# Patient Record
Sex: MALE | Race: BLACK OR AFRICAN AMERICAN | NOT HISPANIC OR LATINO | Employment: OTHER | URBAN - METROPOLITAN AREA
[De-identification: names, ages, dates, MRNs, and addresses within clinical notes are randomized per-mention and may not be internally consistent; named-entity substitution may affect disease eponyms.]

---

## 2023-04-07 DIAGNOSIS — I10 ESSENTIAL (PRIMARY) HYPERTENSION: ICD-10-CM

## 2023-04-07 DIAGNOSIS — E11.65 TYPE 2 DIABETES MELLITUS WITH HYPERGLYCEMIA (MULTI): ICD-10-CM

## 2023-04-07 RX ORDER — LOSARTAN POTASSIUM 100 MG/1
TABLET ORAL
Qty: 90 TABLET | Refills: 0 | Status: SHIPPED | OUTPATIENT
Start: 2023-04-07 | End: 2023-07-19 | Stop reason: SDUPTHER

## 2023-04-07 RX ORDER — METFORMIN HYDROCHLORIDE 500 MG/1
TABLET, EXTENDED RELEASE ORAL
Qty: 180 TABLET | Refills: 0 | Status: SHIPPED | OUTPATIENT
Start: 2023-04-07 | End: 2023-07-19 | Stop reason: SDUPTHER

## 2023-04-18 PROBLEM — F31.9 BIPOLAR AFFECTIVE DISORDER (MULTI): Status: ACTIVE | Noted: 2023-04-18

## 2023-04-18 PROBLEM — Z98.890 H/O COLONOSCOPY: Status: RESOLVED | Noted: 2023-04-18 | Resolved: 2023-04-18

## 2023-04-18 PROBLEM — I10 HYPERTENSION: Status: ACTIVE | Noted: 2023-04-18

## 2023-04-18 PROBLEM — K63.5 COLON POLYP: Status: RESOLVED | Noted: 2023-04-18 | Resolved: 2023-04-18

## 2023-04-18 PROBLEM — E11.9 DIABETES MELLITUS TYPE 2 WITHOUT RETINOPATHY (MULTI): Status: ACTIVE | Noted: 2023-04-18

## 2023-04-18 PROBLEM — F32.A DEPRESSION: Status: RESOLVED | Noted: 2023-04-18 | Resolved: 2023-04-18

## 2023-04-18 PROBLEM — K92.2 LOWER GI BLEED: Status: RESOLVED | Noted: 2023-04-18 | Resolved: 2023-04-18

## 2023-04-18 PROBLEM — H52.13 MYOPIA OF BOTH EYES WITH ASTIGMATISM: Status: RESOLVED | Noted: 2023-04-18 | Resolved: 2023-04-18

## 2023-04-18 PROBLEM — K31.89 EROSIVE GASTROPATHY: Status: RESOLVED | Noted: 2023-04-18 | Resolved: 2023-04-18

## 2023-04-18 PROBLEM — F20.9 SCHIZOPHRENIA (MULTI): Status: ACTIVE | Noted: 2023-04-18

## 2023-04-18 PROBLEM — H52.203 MYOPIA OF BOTH EYES WITH ASTIGMATISM: Status: RESOLVED | Noted: 2023-04-18 | Resolved: 2023-04-18

## 2023-04-18 PROBLEM — E78.5 HYPERLIPIDEMIA: Status: ACTIVE | Noted: 2023-04-18

## 2023-04-18 PROBLEM — Z98.890 HISTORY OF ESOPHAGOGASTRODUODENOSCOPY (EGD): Status: RESOLVED | Noted: 2023-04-18 | Resolved: 2023-04-18

## 2023-06-01 ENCOUNTER — OFFICE VISIT (OUTPATIENT)
Dept: PRIMARY CARE | Facility: CLINIC | Age: 48
End: 2023-06-01
Payer: COMMERCIAL

## 2023-06-01 VITALS
SYSTOLIC BLOOD PRESSURE: 119 MMHG | HEART RATE: 79 BPM | TEMPERATURE: 97.8 F | HEIGHT: 70 IN | BODY MASS INDEX: 40.46 KG/M2 | DIASTOLIC BLOOD PRESSURE: 72 MMHG | WEIGHT: 282.6 LBS

## 2023-06-01 DIAGNOSIS — E11.9 DIABETES MELLITUS TYPE 2 WITHOUT RETINOPATHY (MULTI): Primary | ICD-10-CM

## 2023-06-01 DIAGNOSIS — E78.2 MIXED HYPERLIPIDEMIA: ICD-10-CM

## 2023-06-01 DIAGNOSIS — K31.89 EROSIVE GASTROPATHY: ICD-10-CM

## 2023-06-01 LAB — POC HEMOGLOBIN A1C: 6.3 % (ref 4.2–6.5)

## 2023-06-01 PROCEDURE — 3074F SYST BP LT 130 MM HG: CPT | Performed by: INTERNAL MEDICINE

## 2023-06-01 PROCEDURE — 1036F TOBACCO NON-USER: CPT | Performed by: INTERNAL MEDICINE

## 2023-06-01 PROCEDURE — 3078F DIAST BP <80 MM HG: CPT | Performed by: INTERNAL MEDICINE

## 2023-06-01 PROCEDURE — 4010F ACE/ARB THERAPY RXD/TAKEN: CPT | Performed by: INTERNAL MEDICINE

## 2023-06-01 PROCEDURE — 99213 OFFICE O/P EST LOW 20 MIN: CPT | Performed by: INTERNAL MEDICINE

## 2023-06-01 PROCEDURE — 83036 HEMOGLOBIN GLYCOSYLATED A1C: CPT | Performed by: INTERNAL MEDICINE

## 2023-06-01 RX ORDER — FENOFIBRATE 160 MG/1
160 TABLET ORAL DAILY
COMMUNITY
End: 2023-06-01 | Stop reason: SDUPTHER

## 2023-06-01 RX ORDER — FLUOXETINE HYDROCHLORIDE 40 MG/1
CAPSULE ORAL
COMMUNITY
Start: 2020-12-13

## 2023-06-01 RX ORDER — FENOFIBRATE 160 MG/1
160 TABLET ORAL DAILY
Qty: 90 TABLET | Refills: 3 | Status: SHIPPED | OUTPATIENT
Start: 2023-06-01 | End: 2023-10-04 | Stop reason: SDUPTHER

## 2023-06-01 RX ORDER — PANTOPRAZOLE SODIUM 40 MG/1
40 TABLET, DELAYED RELEASE ORAL DAILY
Qty: 90 TABLET | Refills: 0 | Status: SHIPPED | OUTPATIENT
Start: 2023-06-01 | End: 2023-10-04 | Stop reason: SDUPTHER

## 2023-06-01 RX ORDER — BREXPIPRAZOLE 1 MG/1
4 TABLET ORAL EVERY EVENING
COMMUNITY
Start: 2023-05-22

## 2023-06-01 RX ORDER — AMLODIPINE BESYLATE 2.5 MG/1
2.5 TABLET ORAL DAILY
COMMUNITY
End: 2023-10-04 | Stop reason: SDUPTHER

## 2023-06-01 RX ORDER — QUINIDINE GLUCONATE 324 MG
TABLET, EXTENDED RELEASE ORAL
COMMUNITY

## 2023-06-01 RX ORDER — ATORVASTATIN CALCIUM 20 MG/1
20 TABLET, FILM COATED ORAL NIGHTLY
COMMUNITY
End: 2023-10-04 | Stop reason: SDUPTHER

## 2023-06-01 RX ORDER — ILOPERIDONE 12 MG/1
TABLET ORAL
COMMUNITY
Start: 2021-01-19

## 2023-06-01 RX ORDER — GLIMEPIRIDE 4 MG/1
8 TABLET ORAL DAILY
COMMUNITY
End: 2023-10-04 | Stop reason: SDUPTHER

## 2023-06-01 RX ORDER — TRIAMTERENE/HYDROCHLOROTHIAZID 37.5-25 MG
1 TABLET ORAL DAILY
COMMUNITY
End: 2023-10-04 | Stop reason: SDUPTHER

## 2023-06-01 ASSESSMENT — ENCOUNTER SYMPTOMS
PALPITATIONS: 0
CHILLS: 0
COUGH: 0
POLYDIPSIA: 0
FEVER: 0
SHORTNESS OF BREATH: 0

## 2023-06-01 NOTE — PATIENT INSTRUCTIONS
Continue your current medications.  I have sent in a medication for you to use to help the stomach we will take this once a day for the next 3 months and then be able to stop it.  This should allow the stomach to calm down and heal very well.

## 2023-06-01 NOTE — PROGRESS NOTES
"Subjective   Patient ID: James Esqueda is a 48 y.o. male who presents for Follow-up.    48-year-old male presents today in routine follow-up with a history of diabetes.  He is got excellent control with an A1c today of 6.3.  He denies symptoms of polyuria polydipsia blurry vision.  He needs updated diabetic shoes the ones he currently have hard to carrying and replacements are needed.    One of his concerns today is a diagnosis of erosive gastritis with symptoms a couple times a month with some's upper gastric stomach discomfort.  Medication discussed and sent to pharmacy on patient behalf.    Reviewed patient prior blood work discussed refills of his fenofibrate we will continue it for the time being patient never has side effects including myalgias.  Cholesterol without it had triglycerides 250-300.  Outside of new side effects or concerns I would continue the medication plan as it is patient counseled and advised of this.         Review of Systems   Constitutional:  Negative for chills and fever.   Respiratory:  Negative for cough and shortness of breath.    Cardiovascular:  Negative for chest pain and palpitations.   Endocrine: Negative for polydipsia and polyuria.       Objective   /72   Pulse 79   Temp 36.6 °C (97.8 °F) (Temporal)   Ht 1.778 m (5' 10\")   Wt 128 kg (282 lb 9.6 oz)   BMI 40.55 kg/m²     Physical Exam  Constitutional:       Appearance: Normal appearance.   HENT:      Head: Normocephalic and atraumatic.   Eyes:      Extraocular Movements: Extraocular movements intact.      Pupils: Pupils are equal, round, and reactive to light.   Neck:      Thyroid: No thyroid mass or thyromegaly.      Vascular: No carotid bruit.   Cardiovascular:      Rate and Rhythm: Normal rate and regular rhythm.      Heart sounds: No murmur heard.     No friction rub. No gallop (s).   Pulmonary:      Effort: No respiratory distress.      Breath sounds: No wheezing, rhonchi or rales.   Musculoskeletal:      Cervical " back: Neck supple.      Right lower leg: No edema.      Left lower leg: No edema.   Lymphadenopathy:      Cervical: No cervical adenopathy.   Neurological:      Mental Status: He is alert.         Assessment/Plan   Problem List Items Addressed This Visit          Endocrine/Metabolic    Diabetes mellitus type 2 without retinopathy (CMS/HCC) - Primary    Relevant Orders    POCT glycosylated hemoglobin (Hb A1C) manually resulted (Completed)    Referral to Podiatry       Other    Hyperlipidemia    Relevant Medications    fenofibrate (Triglide) 160 mg tablet     Other Visit Diagnoses       Erosive gastropathy        Relevant Medications    pantoprazole (ProtoNix) 40 mg EC tablet

## 2023-06-21 LAB
BASOPHILS (10*3/UL) IN BLOOD BY AUTOMATED COUNT: 0.04 X10E9/L (ref 0–0.1)
BASOPHILS/100 LEUKOCYTES IN BLOOD BY AUTOMATED COUNT: 0.6 % (ref 0–2)
EOSINOPHILS (10*3/UL) IN BLOOD BY AUTOMATED COUNT: 0.1 X10E9/L (ref 0–0.7)
EOSINOPHILS/100 LEUKOCYTES IN BLOOD BY AUTOMATED COUNT: 1.5 % (ref 0–6)
ERYTHROCYTE DISTRIBUTION WIDTH (RATIO) BY AUTOMATED COUNT: 13.8 % (ref 11.5–14.5)
ERYTHROCYTE MEAN CORPUSCULAR HEMOGLOBIN CONCENTRATION (G/DL) BY AUTOMATED: 33.5 G/DL (ref 32–36)
ERYTHROCYTE MEAN CORPUSCULAR VOLUME (FL) BY AUTOMATED COUNT: 86 FL (ref 80–100)
ERYTHROCYTES (10*6/UL) IN BLOOD BY AUTOMATED COUNT: 4.04 X10E12/L (ref 4.5–5.9)
FERRITIN (UG/LL) IN SER/PLAS: 387 UG/L (ref 20–300)
HEMATOCRIT (%) IN BLOOD BY AUTOMATED COUNT: 34.6 % (ref 41–52)
HEMOGLOBIN (G/DL) IN BLOOD: 11.6 G/DL (ref 13.5–17.5)
IMMATURE GRANULOCYTES/100 LEUKOCYTES IN BLOOD BY AUTOMATED COUNT: 0.9 % (ref 0–0.9)
IRON (UG/DL) IN SER/PLAS: 66 UG/DL (ref 35–150)
IRON BINDING CAPACITY (UG/DL) IN SER/PLAS: 394 UG/DL (ref 240–445)
IRON SATURATION (%) IN SER/PLAS: 17 % (ref 25–45)
LEUKOCYTES (10*3/UL) IN BLOOD BY AUTOMATED COUNT: 6.7 X10E9/L (ref 4.4–11.3)
LYMPHOCYTES (10*3/UL) IN BLOOD BY AUTOMATED COUNT: 2.29 X10E9/L (ref 1.2–4.8)
LYMPHOCYTES/100 LEUKOCYTES IN BLOOD BY AUTOMATED COUNT: 34 % (ref 13–44)
MONOCYTES (10*3/UL) IN BLOOD BY AUTOMATED COUNT: 0.56 X10E9/L (ref 0.1–1)
MONOCYTES/100 LEUKOCYTES IN BLOOD BY AUTOMATED COUNT: 8.3 % (ref 2–10)
NEUTROPHILS (10*3/UL) IN BLOOD BY AUTOMATED COUNT: 3.69 X10E9/L (ref 1.2–7.7)
NEUTROPHILS/100 LEUKOCYTES IN BLOOD BY AUTOMATED COUNT: 54.7 % (ref 40–80)
PLATELETS (10*3/UL) IN BLOOD AUTOMATED COUNT: 375 X10E9/L (ref 150–450)

## 2023-06-26 LAB — SOLUBLE TRANSFERRIN RECEPTOR: 2.3 MG/L (ref 2.2–5)

## 2023-07-19 DIAGNOSIS — I10 ESSENTIAL (PRIMARY) HYPERTENSION: ICD-10-CM

## 2023-07-19 DIAGNOSIS — E11.65 TYPE 2 DIABETES MELLITUS WITH HYPERGLYCEMIA (MULTI): ICD-10-CM

## 2023-07-19 RX ORDER — LOSARTAN POTASSIUM 100 MG/1
100 TABLET ORAL DAILY
Qty: 90 TABLET | Refills: 1 | Status: SHIPPED | OUTPATIENT
Start: 2023-07-19 | End: 2024-01-10

## 2023-07-19 RX ORDER — METFORMIN HYDROCHLORIDE 500 MG/1
1000 TABLET, EXTENDED RELEASE ORAL DAILY
Qty: 180 TABLET | Refills: 0 | Status: SHIPPED | OUTPATIENT
Start: 2023-07-19 | End: 2023-10-04 | Stop reason: SDUPTHER

## 2023-09-15 DIAGNOSIS — I10 ESSENTIAL (PRIMARY) HYPERTENSION: ICD-10-CM

## 2023-09-15 RX ORDER — SITAGLIPTIN 100 MG/1
100 TABLET, FILM COATED ORAL DAILY
Qty: 90 TABLET | Refills: 2 | Status: SHIPPED | OUTPATIENT
Start: 2023-09-15 | End: 2023-10-04 | Stop reason: SDUPTHER

## 2023-10-04 ENCOUNTER — OFFICE VISIT (OUTPATIENT)
Dept: PRIMARY CARE | Facility: CLINIC | Age: 48
End: 2023-10-04
Payer: COMMERCIAL

## 2023-10-04 ENCOUNTER — LAB (OUTPATIENT)
Dept: LAB | Facility: LAB | Age: 48
End: 2023-10-04
Payer: COMMERCIAL

## 2023-10-04 VITALS
WEIGHT: 293.2 LBS | BODY MASS INDEX: 42.07 KG/M2 | HEART RATE: 80 BPM | DIASTOLIC BLOOD PRESSURE: 74 MMHG | TEMPERATURE: 98.6 F | SYSTOLIC BLOOD PRESSURE: 118 MMHG

## 2023-10-04 DIAGNOSIS — E11.9 DIABETES MELLITUS TYPE 2 WITHOUT RETINOPATHY (MULTI): ICD-10-CM

## 2023-10-04 DIAGNOSIS — K62.5 BRIGHT RED RECTAL BLEEDING: ICD-10-CM

## 2023-10-04 DIAGNOSIS — E11.65 TYPE 2 DIABETES MELLITUS WITH HYPERGLYCEMIA (MULTI): ICD-10-CM

## 2023-10-04 DIAGNOSIS — I10 ESSENTIAL (PRIMARY) HYPERTENSION: ICD-10-CM

## 2023-10-04 DIAGNOSIS — R52 PAIN AGGRAVATED BY SITTING: ICD-10-CM

## 2023-10-04 DIAGNOSIS — I10 PRIMARY HYPERTENSION: ICD-10-CM

## 2023-10-04 DIAGNOSIS — E78.2 MIXED HYPERLIPIDEMIA: ICD-10-CM

## 2023-10-04 DIAGNOSIS — K31.89 EROSIVE GASTROPATHY: ICD-10-CM

## 2023-10-04 DIAGNOSIS — R22.2 MASS OF BUTTOCK: ICD-10-CM

## 2023-10-04 DIAGNOSIS — K62.5 BRIGHT RED RECTAL BLEEDING: Primary | ICD-10-CM

## 2023-10-04 PROCEDURE — 83540 ASSAY OF IRON: CPT

## 2023-10-04 PROCEDURE — 83550 IRON BINDING TEST: CPT

## 2023-10-04 PROCEDURE — 1036F TOBACCO NON-USER: CPT | Performed by: INTERNAL MEDICINE

## 2023-10-04 PROCEDURE — 36415 COLL VENOUS BLD VENIPUNCTURE: CPT

## 2023-10-04 PROCEDURE — 3074F SYST BP LT 130 MM HG: CPT | Performed by: INTERNAL MEDICINE

## 2023-10-04 PROCEDURE — 3078F DIAST BP <80 MM HG: CPT | Performed by: INTERNAL MEDICINE

## 2023-10-04 PROCEDURE — 80053 COMPREHEN METABOLIC PANEL: CPT

## 2023-10-04 PROCEDURE — 85025 COMPLETE CBC W/AUTO DIFF WBC: CPT

## 2023-10-04 PROCEDURE — 83036 HEMOGLOBIN GLYCOSYLATED A1C: CPT

## 2023-10-04 PROCEDURE — 99214 OFFICE O/P EST MOD 30 MIN: CPT | Performed by: INTERNAL MEDICINE

## 2023-10-04 PROCEDURE — 4010F ACE/ARB THERAPY RXD/TAKEN: CPT | Performed by: INTERNAL MEDICINE

## 2023-10-04 RX ORDER — AMLODIPINE BESYLATE 2.5 MG/1
2.5 TABLET ORAL DAILY
Qty: 90 TABLET | Refills: 3 | Status: SHIPPED | OUTPATIENT
Start: 2023-10-04 | End: 2024-02-06 | Stop reason: SDUPTHER

## 2023-10-04 RX ORDER — METFORMIN HYDROCHLORIDE 500 MG/1
1000 TABLET, EXTENDED RELEASE ORAL DAILY
Qty: 180 TABLET | Refills: 3 | Status: SHIPPED | OUTPATIENT
Start: 2023-10-04 | End: 2024-02-06 | Stop reason: SDUPTHER

## 2023-10-04 RX ORDER — ATORVASTATIN CALCIUM 20 MG/1
20 TABLET, FILM COATED ORAL NIGHTLY
Qty: 90 TABLET | Refills: 3 | Status: SHIPPED | OUTPATIENT
Start: 2023-10-04 | End: 2024-02-06 | Stop reason: SDUPTHER

## 2023-10-04 RX ORDER — TRIAMTERENE/HYDROCHLOROTHIAZID 37.5-25 MG
1 TABLET ORAL DAILY
Qty: 90 TABLET | Refills: 3 | Status: SHIPPED | OUTPATIENT
Start: 2023-10-04 | End: 2024-02-06 | Stop reason: SDUPTHER

## 2023-10-04 RX ORDER — FENOFIBRATE 160 MG/1
160 TABLET ORAL DAILY
Qty: 90 TABLET | Refills: 3 | Status: SHIPPED | OUTPATIENT
Start: 2023-10-04 | End: 2024-02-06 | Stop reason: SDUPTHER

## 2023-10-04 RX ORDER — BREXPIPRAZOLE 4 MG/1
4 TABLET ORAL DAILY
COMMUNITY

## 2023-10-04 RX ORDER — GLIMEPIRIDE 4 MG/1
8 TABLET ORAL DAILY
Qty: 180 TABLET | Refills: 3 | Status: SHIPPED | OUTPATIENT
Start: 2023-10-04 | End: 2024-02-06 | Stop reason: SDUPTHER

## 2023-10-04 RX ORDER — PANTOPRAZOLE SODIUM 40 MG/1
40 TABLET, DELAYED RELEASE ORAL DAILY
Qty: 90 TABLET | Refills: 3 | Status: SHIPPED | OUTPATIENT
Start: 2023-10-04 | End: 2024-02-06 | Stop reason: SDUPTHER

## 2023-10-04 ASSESSMENT — ENCOUNTER SYMPTOMS
FEVER: 0
SHORTNESS OF BREATH: 0
COUGH: 0
POLYDIPSIA: 0
PALPITATIONS: 0
CHILLS: 0

## 2023-10-04 NOTE — PROGRESS NOTES
Subjective   Patient ID: James Esqueda is a 48 y.o. male who presents for Follow-up.    48-year-old male presents for routine diabetic follow-up.  He is no concerns about his current diabetic management medications hypoglycemia polyuria polydipsia or vision changes at this time.  All medications are taking with good compliance and no concerns.  He has no specific diabetic questions at this time.  He does report one acute concern in recent weeks.  He says that there has been recurring bouts of pain with sitting and recurring bright red blood in his underwear and by rectum he believes.  Been no significant blood in the toilet bowl on a bowel movement but when he sees blood it is bright red and most commonly in his underwear.  He says this has been happening intermittently for the last 3 to 4 weeks.  He has a history of prior colonoscopy x2 with noted diverticulosis nonbleeding and a prior nonbleeding external hemorrhoid noted.  His physical exam however shows a polypoid mass of the superior aspect of the right buttock with ulceration and nonactive bleeding at this time.  This would be consistent with the patient's history and there is evidence of previous bleeding noted.  General surgery evaluation ordered for removal and definitive care.  No evidence for rectal bleeding no visualized polyp hemorrhoid or anal fissure.    Blood work pending, patient will be notified upon completion.    Patient directed next-door to general surgeon's office for scheduling prior to departure from [facility] and to follow-up with me in 4 months for routine diabetic care         Review of Systems   Constitutional:  Negative for chills and fever.   Respiratory:  Negative for cough and shortness of breath.    Cardiovascular:  Negative for chest pain and palpitations.   Endocrine: Negative for polydipsia and polyuria.       Objective   /74   Pulse 80   Temp 37 °C (98.6 °F) (Temporal)   Wt 133 kg (293 lb 3.2 oz)   BMI 42.07 kg/m²      Physical Exam  Constitutional:       Appearance: Normal appearance.   HENT:      Head: Normocephalic and atraumatic.   Eyes:      Extraocular Movements: Extraocular movements intact.      Pupils: Pupils are equal, round, and reactive to light.   Neck:      Thyroid: No thyroid mass or thyromegaly.      Vascular: No carotid bruit.   Cardiovascular:      Rate and Rhythm: Normal rate and regular rhythm.      Heart sounds: No murmur heard.     No friction rub. No gallop.   Pulmonary:      Effort: No respiratory distress.      Breath sounds: No wheezing, rhonchi or rales.   Musculoskeletal:      Cervical back: Neck supple.      Right lower leg: No edema.      Left lower leg: No edema.   Lymphadenopathy:      Cervical: No cervical adenopathy.   Neurological:      Mental Status: He is alert.         Assessment/Plan   Problem List Items Addressed This Visit             ICD-10-CM    Diabetes mellitus type 2 without retinopathy (CMS/HCC) E11.9    Relevant Medications    atorvastatin (Lipitor) 20 mg tablet    Other Relevant Orders    Iron and TIBC    CBC and Auto Differential    Hemoglobin A1C    Comprehensive Metabolic Panel    Hyperlipidemia E78.5    Relevant Medications    fenofibrate (Triglide) 160 mg tablet    Hypertension I10    Relevant Medications    amLODIPine (Norvasc) 2.5 mg tablet    triamterene-hydrochlorothiazid (Maxzide-25) 37.5-25 mg tablet    Other Relevant Orders    Iron and TIBC    CBC and Auto Differential    Hemoglobin A1C    Comprehensive Metabolic Panel     Other Visit Diagnoses         Codes    Bright red rectal bleeding    -  Primary K62.5    Relevant Orders    Iron and TIBC    CBC and Auto Differential    Hemoglobin A1C    Comprehensive Metabolic Panel    Pain aggravated by sitting     R52    Relevant Orders    Iron and TIBC    CBC and Auto Differential    Hemoglobin A1C    Comprehensive Metabolic Panel    Mass of buttock     R22.2    Relevant Orders    Referral to General Surgery    Erosive  gastropathy     K31.89    Relevant Medications    pantoprazole (ProtoNix) 40 mg EC tablet

## 2023-10-05 LAB
ALBUMIN SERPL BCP-MCNC: 4.6 G/DL (ref 3.4–5)
ALP SERPL-CCNC: 43 U/L (ref 33–120)
ALT SERPL W P-5'-P-CCNC: 17 U/L (ref 10–52)
ANION GAP SERPL CALC-SCNC: 14 MMOL/L (ref 10–20)
AST SERPL W P-5'-P-CCNC: 14 U/L (ref 9–39)
BASOPHILS # BLD AUTO: 0.03 X10*3/UL (ref 0–0.1)
BASOPHILS NFR BLD AUTO: 0.4 %
BILIRUB SERPL-MCNC: 0.4 MG/DL (ref 0–1.2)
BUN SERPL-MCNC: 20 MG/DL (ref 6–23)
CALCIUM SERPL-MCNC: 10.2 MG/DL (ref 8.6–10.6)
CHLORIDE SERPL-SCNC: 104 MMOL/L (ref 98–107)
CO2 SERPL-SCNC: 28 MMOL/L (ref 21–32)
CREAT SERPL-MCNC: 1.29 MG/DL (ref 0.5–1.3)
EOSINOPHIL # BLD AUTO: 0.1 X10*3/UL (ref 0–0.7)
EOSINOPHIL NFR BLD AUTO: 1.3 %
ERYTHROCYTE [DISTWIDTH] IN BLOOD BY AUTOMATED COUNT: 13.8 % (ref 11.5–14.5)
EST. AVERAGE GLUCOSE BLD GHB EST-MCNC: 143 MG/DL
GFR SERPL CREATININE-BSD FRML MDRD: 69 ML/MIN/1.73M*2
GLUCOSE SERPL-MCNC: 82 MG/DL (ref 74–99)
HBA1C MFR BLD: 6.6 %
HCT VFR BLD AUTO: 37.7 % (ref 41–52)
HGB BLD-MCNC: 11.8 G/DL (ref 13.5–17.5)
IMM GRANULOCYTES # BLD AUTO: 0.05 X10*3/UL (ref 0–0.7)
IMM GRANULOCYTES NFR BLD AUTO: 0.7 % (ref 0–0.9)
IRON SATN MFR SERPL: 13 % (ref 25–45)
IRON SERPL-MCNC: 56 UG/DL (ref 35–150)
LYMPHOCYTES # BLD AUTO: 2.56 X10*3/UL (ref 1.2–4.8)
LYMPHOCYTES NFR BLD AUTO: 34.3 %
MCH RBC QN AUTO: 27.3 PG (ref 26–34)
MCHC RBC AUTO-ENTMCNC: 31.3 G/DL (ref 32–36)
MCV RBC AUTO: 87 FL (ref 80–100)
MONOCYTES # BLD AUTO: 0.58 X10*3/UL (ref 0.1–1)
MONOCYTES NFR BLD AUTO: 7.8 %
NEUTROPHILS # BLD AUTO: 4.14 X10*3/UL (ref 1.2–7.7)
NEUTROPHILS NFR BLD AUTO: 55.5 %
NRBC BLD-RTO: 0 /100 WBCS (ref 0–0)
PLATELET # BLD AUTO: 441 X10*3/UL (ref 150–450)
PMV BLD AUTO: 9.3 FL (ref 7.5–11.5)
POTASSIUM SERPL-SCNC: 4.6 MMOL/L (ref 3.5–5.3)
PROT SERPL-MCNC: 7.3 G/DL (ref 6.4–8.2)
RBC # BLD AUTO: 4.32 X10*6/UL (ref 4.5–5.9)
SODIUM SERPL-SCNC: 141 MMOL/L (ref 136–145)
TIBC SERPL-MCNC: 423 UG/DL (ref 240–445)
UIBC SERPL-MCNC: 367 UG/DL (ref 110–370)
WBC # BLD AUTO: 7.5 X10*3/UL (ref 4.4–11.3)

## 2023-10-05 NOTE — RESULT ENCOUNTER NOTE
Pt labs look great. Sugar continues to be excellent in control. Blood counts and iron levels normal. No changes to plan, see surgery for appt and should be fine

## 2023-10-18 PROBLEM — R10.9 ABDOMINAL CRAMPING: Status: ACTIVE | Noted: 2023-10-18

## 2023-10-18 PROBLEM — M77.8 FINGER TENDINITIS: Status: ACTIVE | Noted: 2023-10-18

## 2023-10-18 PROBLEM — M54.50 CHRONIC LOW BACK PAIN: Status: ACTIVE | Noted: 2023-10-18

## 2023-10-18 PROBLEM — D64.9 ANEMIA: Status: ACTIVE | Noted: 2023-10-18

## 2023-10-18 PROBLEM — G89.29 CHRONIC LOW BACK PAIN: Status: ACTIVE | Noted: 2023-10-18

## 2023-10-18 PROBLEM — R05.3 CHRONIC COUGH: Status: ACTIVE | Noted: 2023-10-18

## 2023-10-19 ENCOUNTER — OFFICE VISIT (OUTPATIENT)
Dept: SURGERY | Facility: CLINIC | Age: 48
End: 2023-10-19
Payer: COMMERCIAL

## 2023-10-19 ENCOUNTER — DOCUMENTATION (OUTPATIENT)
Dept: SURGERY | Facility: CLINIC | Age: 48
End: 2023-10-19

## 2023-10-19 VITALS — BODY MASS INDEX: 41.37 KG/M2 | HEIGHT: 70 IN | WEIGHT: 289 LBS

## 2023-10-19 DIAGNOSIS — K64.4 SKIN TAG OF PERIANAL REGION: Primary | ICD-10-CM

## 2023-10-19 DIAGNOSIS — R22.2 MASS OF BUTTOCK: ICD-10-CM

## 2023-10-19 PROCEDURE — 1036F TOBACCO NON-USER: CPT | Performed by: SURGERY

## 2023-10-19 PROCEDURE — 3044F HG A1C LEVEL LT 7.0%: CPT | Performed by: SURGERY

## 2023-10-19 PROCEDURE — 99203 OFFICE O/P NEW LOW 30 MIN: CPT | Performed by: SURGERY

## 2023-10-19 PROCEDURE — 4010F ACE/ARB THERAPY RXD/TAKEN: CPT | Performed by: SURGERY

## 2023-10-19 ASSESSMENT — PAIN SCALES - GENERAL: PAINLEVEL: 0-NO PAIN

## 2023-10-19 NOTE — PROGRESS NOTES
Subjective   Patient ID: James Esqueda is a 48 y.o. male who presents for evaluation of a mass involving his left buttock.       HPI  Pt is referred by Dr. Polanco for evaluation of a mass that is polypoid in the superior aspect of the right buttock with ulceration.  Patient states he has been symptomatic for 3 to 4 weeks.  Initially he says he had blood every day that he was able to note and it was associated with pain.  For the last week he does not note any blood and/or any pain.       Colonoscopy 2/2019 - polyps, nonbleeding hem  EGD 6/2016 - nonbleeding erosive gastropathy, hiatal hernia    Past Medical History:   Diagnosis Date    Bitten or stung by nonvenomous insect and other nonvenomous arthropods, initial encounter 10/08/2015    Insect bite, multiple    Depression 04/18/2023    Erosive gastropathy 04/18/2023    H/O colonoscopy 04/18/2023    History of esophagogastroduodenoscopy (EGD) 04/18/2023    Personal history of other infectious and parasitic diseases     History of scarlet fever    Personal history of other infectious and parasitic diseases     History of staphylococcal infection    Personal history of other infectious and parasitic diseases     History of varicella    Prediabetes 12/08/2016    Prediabetes    Prediabetes 12/08/2016    Prediabetes    Prediabetes 12/08/2016    Prediabetes    Sprain of interphalangeal joint of unspecified finger, initial encounter 05/01/2018    Jersey finger, initial encounter    Bipolar affective disorder, HTN, DM, HLD, anemia       Review of Systems  Denies abnormal weight loss, night sweats, fever. Denies chills, sob, v/d/c. No PICA. Denies any other abnormal bleeding or bruising  Objective   Physical Exam    Mild obesity    Placed on the rectal exam table.  Obvious pedunculated skin tag upper inner quadrant left buttock. Measures 2.5 cm    Impression: Large skin tag right buttock.  We will schedule for excision on outpatient basis at [facility]

## 2023-10-19 NOTE — LETTER
October 19, 2023     Duke Polanco MD  [address] [facility] [id number]    Patient: James Esqueda   YOB: 1975   Date of Visit: 10/19/2023       Dear Dr. Duke Polanco MD:    Thank you for referring James Esqueda to me for evaluation. Below are my notes for this consultation.  If you have questions, please do not hesitate to call me. I look forward to following your patient along with you.       Sincerely,     Duke Zamora MD      CC: No Recipients  ______________________________________________________________________________________    Subjective  Patient ID: James Esqueda is a 48 y.o. male who presents for evaluation of a mass involving his left buttock.       HPI  Pt is referred by Dr. Polanco for evaluation of a mass that is polypoid in the superior aspect of the right buttock with ulceration.  Patient states he has been symptomatic for 3 to 4 weeks.  Initially he says he had blood every day that he was able to note and it was associated with pain.  For the last week he does not note any blood and/or any pain.       Colonoscopy 2/2019 - polyps, nonbleeding hem  EGD 6/2016 - nonbleeding erosive gastropathy, hiatal hernia    Past Medical History:   Diagnosis Date   • Bitten or stung by nonvenomous insect and other nonvenomous arthropods, initial encounter 10/08/2015    Insect bite, multiple   • Depression 04/18/2023   • Erosive gastropathy 04/18/2023   • H/O colonoscopy 04/18/2023   • History of esophagogastroduodenoscopy (EGD) 04/18/2023   • Personal history of other infectious and parasitic diseases     History of scarlet fever   • Personal history of other infectious and parasitic diseases     History of staphylococcal infection   • Personal history of other infectious and parasitic diseases     History of varicella   • Prediabetes 12/08/2016    Prediabetes   • Prediabetes 12/08/2016    Prediabetes   • Prediabetes 12/08/2016    Prediabetes   • Sprain of interphalangeal joint of  unspecified finger, initial encounter 05/01/2018    Left Hand finger, initial encounter    Bipolar affective disorder, HTN, DM, HLD, anemia       Review of Systems  Denies abnormal weight loss, night sweats, fever. Denies chills, sob, v/d/c. No PICA. Denies any other abnormal bleeding or bruising  Objective  Physical Exam    Mild obesity    Placed on the rectal exam table.  Obvious pedunculated skin tag upper inner quadrant left buttock. Measures 2.5 cm    Impression: Large skin tag right buttock.  We will schedule for excision on outpatient basis at [facility]

## 2023-10-30 ENCOUNTER — PROCEDURE VISIT (OUTPATIENT)
Dept: SURGERY | Facility: CLINIC | Age: 48
End: 2023-10-30
Payer: COMMERCIAL

## 2023-10-30 VITALS — BODY MASS INDEX: 41.37 KG/M2 | WEIGHT: 289 LBS | HEIGHT: 70 IN

## 2023-10-30 DIAGNOSIS — K64.4 SKIN TAG OF PERIANAL REGION: Primary | ICD-10-CM

## 2023-10-30 DIAGNOSIS — E11.65 TYPE 2 DIABETES MELLITUS WITH HYPERGLYCEMIA (MULTI): ICD-10-CM

## 2023-10-30 PROCEDURE — 11402 EXC TR-EXT B9+MARG 1.1-2 CM: CPT | Performed by: SURGERY

## 2023-10-30 PROCEDURE — 99212 OFFICE O/P EST SF 10 MIN: CPT | Performed by: SURGERY

## 2023-10-30 PROCEDURE — 88304 TISSUE EXAM BY PATHOLOGIST: CPT | Mod: TC,SUR | Performed by: SURGERY

## 2023-10-30 PROCEDURE — 88304 TISSUE EXAM BY PATHOLOGIST: CPT | Performed by: PATHOLOGY

## 2023-10-30 RX ORDER — GLIMEPIRIDE 4 MG/1
8 TABLET ORAL DAILY
Qty: 180 TABLET | Refills: 3 | OUTPATIENT
Start: 2023-10-30 | End: 2024-10-29

## 2023-10-30 ASSESSMENT — PAIN SCALES - GENERAL: PAINLEVEL: 0-NO PAIN

## 2023-10-30 NOTE — LETTER
October 30, 2023     Duke Polanco MD  [address] [facility] [address]    Patient: James Esqueda   YOB: 1975   Date of Visit: 10/30/2023       Dear Dr. Duke Polanco MD:    Thank you for referring James Esqueda to me for evaluation. Below are my notes for this consultation.  If you have questions, please do not hesitate to call me. I look forward to following your patient along with you.       Sincerely,     Duke Zamora MD      CC: No Recipients  ______________________________________________________________________________________    47-year-old gentleman comes in for excisional biopsy of a large, pedunculated skin tag involving his left buttock.  He agrees to the procedure.    Procedure:  He was placed on the rectal exam table in the prone jackknife position.  The area overlying the skin tag in the upper inner quadrant of the left buttock was sterilely prepared.  We infiltrated the base with a solution of 1% lidocaine with epinephrine.  We excised the skin tag and this was handed off the field as a specimen.  It measured 2.1 cm.  We irrigated the wound and then after ensuring hemostasis we closed the incision using 3-0 subcuticular Vicryl suture followed by Dermabond glue.  Patient tolerated the procedure without difficulty.    Impression: Patient comes in for excisional biopsy of a large, pedunculated skin tag left buttock.    Instructed to give us a call should he have bleeding, excessive pain or signs of infection    Otherwise, follow-up with me in 2 weeks  
 used

## 2023-10-30 NOTE — PROGRESS NOTES
47-year-old gentleman comes in for excisional biopsy of a large, pedunculated skin tag involving his left buttock.  He agrees to the procedure.    Procedure:  He was placed on the rectal exam table in the prone jackknife position.  The area overlying the skin tag in the upper inner quadrant of the left buttock was sterilely prepared.  We infiltrated the base with a solution of 1% lidocaine with epinephrine.  We excised the skin tag and this was handed off the field as a specimen.  It measured 2.1 cm.  We irrigated the wound and then after ensuring hemostasis we closed the incision using 3-0 subcuticular Vicryl suture followed by Dermabond glue.  Patient tolerated the procedure without difficulty.    Impression: Patient comes in for excisional biopsy of a large, pedunculated skin tag left buttock.    Instructed to give us a call should he have bleeding, excessive pain or signs of infection    Otherwise, follow-up with me in 2 weeks

## 2023-11-12 LAB
LABORATORY COMMENT REPORT: NORMAL
PATH REPORT.FINAL DX SPEC: NORMAL
PATH REPORT.GROSS SPEC: NORMAL
PATH REPORT.RELEVANT HX SPEC: NORMAL
PATH REPORT.TOTAL CANCER: NORMAL

## 2023-11-15 ENCOUNTER — OFFICE VISIT (OUTPATIENT)
Dept: SURGERY | Facility: CLINIC | Age: 48
End: 2023-11-15
Payer: COMMERCIAL

## 2023-11-15 VITALS — BODY MASS INDEX: 41.37 KG/M2 | HEIGHT: 70 IN | WEIGHT: 289 LBS

## 2023-11-15 DIAGNOSIS — Z09 FOLLOW-UP EXAM: Primary | ICD-10-CM

## 2023-11-15 PROCEDURE — 4010F ACE/ARB THERAPY RXD/TAKEN: CPT | Performed by: SURGERY

## 2023-11-15 PROCEDURE — 99024 POSTOP FOLLOW-UP VISIT: CPT | Performed by: SURGERY

## 2023-11-15 PROCEDURE — 1036F TOBACCO NON-USER: CPT | Performed by: SURGERY

## 2023-11-15 PROCEDURE — 3044F HG A1C LEVEL LT 7.0%: CPT | Performed by: SURGERY

## 2023-11-15 ASSESSMENT — PAIN SCALES - GENERAL: PAINLEVEL: 0-NO PAIN

## 2023-11-15 NOTE — PROGRESS NOTES
Mr. Jmaes Esqueda is a 48 y.o. year old male who comes in today for follow-up after undergoing excisional biopsy of a large skin tag left buttock.  This procedure was performed on 10/30/2023    Patient is doing very well and is pleased with the outcome of the surgery.    Tolerating a regular diet, bowel movements are normal    On exam patient looks well    Incisions are healing very nicely    Surgical Pathology Exam: [id number]  Order: [id number]  Collected 10/30/2023 12:37       Status: Final result       Visible to patient: Yes (seen)       Dx: Skin tag of perianal region    0 Result Notes      Component    FINAL DIAGNOSIS   A. SKIN, LEFT BUTTOCK, EXCISIONAL BIOPSY:   --PEDUNCULATED LIPOFIBROMA, IRRITATED AND INFLAMED               Impression: Doing well following  excisional biopsy of a large skin tag left buttock    Reassured benign nature result of biopsy     Discussed increasing activity level    Follow-up with me as needed

## 2023-11-15 NOTE — LETTER
November 15, 2023     Duke Polanco MD  [address] [facility] [address]    Patient: James Esqueda   YOB: 1975   Date of Visit: 11/15/2023       Dear Dr. Duke Polanco MD:    Thank you for referring James Esqueda to me for evaluation. Below are my notes for this consultation.  If you have questions, please do not hesitate to call me. I look forward to following your patient along with you.       Sincerely,     Duke Zamora MD      CC: No Recipients  ______________________________________________________________________________________       Mr. James Esqueda is a 48 y.o. year old male who comes in today for follow-up after undergoing excisional biopsy of a large skin tag left buttock.  This procedure was performed on 10/30/2023    Patient is doing very well and is pleased with the outcome of the surgery.    Tolerating a regular diet, bowel movements are normal    On exam patient looks well    Incisions are healing very nicely    Surgical Pathology Exam: [id number]  Order: [id number]  Collected 10/30/2023 12:37       Status: Final result       Visible to patient: Yes (seen)       Dx: Skin tag of perianal region    0 Result Notes      Component    FINAL DIAGNOSIS   A. SKIN, LEFT BUTTOCK, EXCISIONAL BIOPSY:   --PEDUNCULATED LIPOFIBROMA, IRRITATED AND INFLAMED               Impression: Doing well following  excisional biopsy of a large skin tag left buttock    Reassured benign nature result of biopsy     Discussed increasing activity level    Follow-up with me as needed

## 2023-11-20 ASSESSMENT — EXTERNAL EXAM - LEFT EYE: OS_EXAM: NORMAL

## 2023-11-20 ASSESSMENT — SLIT LAMP EXAM - LIDS
COMMENTS: GOOD POSITION
COMMENTS: GOOD POSITION

## 2023-11-20 ASSESSMENT — CUP TO DISC RATIO
OD_RATIO: 0.4
OS_RATIO: 0.35

## 2023-11-20 ASSESSMENT — EXTERNAL EXAM - RIGHT EYE: OD_EXAM: NORMAL

## 2023-11-20 NOTE — PROGRESS NOTES
Diabetes [id number]  -HbA1c= 6.6 (10/4/23). No diabetic retinopathy seen on exam. Continue close monitoring of blood glucose, blood pressure, and cholesterol. Plan for annual dilated eye exam.    Myopia of both eyes with [id number]  -Patient currently driving without glasses, did not fill Rx from last year.  -Variable refraction today with progressive myopia over time. Patient denies recent hyperglycemia. F/u for repeat refraction (autorefract first) and OCT macula.   -Once Rx is given, patient should wear glasses when driving.       MRx 8/7/17:   OD: -0.50  -1.00  x 005  OS: -0.75  -0.75  x 150

## 2023-11-21 ENCOUNTER — OFFICE VISIT (OUTPATIENT)
Dept: OPHTHALMOLOGY | Facility: CLINIC | Age: 48
End: 2023-11-21
Payer: COMMERCIAL

## 2023-11-21 DIAGNOSIS — H52.13 MYOPIA OF BOTH EYES: ICD-10-CM

## 2023-11-21 DIAGNOSIS — H52.4 PRESBYOPIA: ICD-10-CM

## 2023-11-21 DIAGNOSIS — H52.203 ASTIGMATISM OF BOTH EYES, UNSPECIFIED TYPE: ICD-10-CM

## 2023-11-21 DIAGNOSIS — E11.9 CONTROLLED TYPE 2 DIABETES MELLITUS WITHOUT COMPLICATION, WITHOUT LONG-TERM CURRENT USE OF INSULIN (MULTI): Primary | ICD-10-CM

## 2023-11-21 PROCEDURE — 92014 COMPRE OPH EXAM EST PT 1/>: CPT | Performed by: OPHTHALMOLOGY

## 2023-11-21 ASSESSMENT — ENCOUNTER SYMPTOMS
ENDOCRINE NEGATIVE: 0
HEMATOLOGIC/LYMPHATIC NEGATIVE: 0
MUSCULOSKELETAL NEGATIVE: 0
PSYCHIATRIC NEGATIVE: 0
CARDIOVASCULAR NEGATIVE: 0
CONSTITUTIONAL NEGATIVE: 0
EYES NEGATIVE: 0
RESPIRATORY NEGATIVE: 0
GASTROINTESTINAL NEGATIVE: 0
ALLERGIC/IMMUNOLOGIC NEGATIVE: 0
NEUROLOGICAL NEGATIVE: 0

## 2023-11-21 ASSESSMENT — VISUAL ACUITY
OD_PH_SC: 20/25
METHOD: SNELLEN - LINEAR
OD_SC: 20/50
OS_PH_SC: 20/40
OS_SC: 20/70+

## 2023-11-21 ASSESSMENT — REFRACTION_MANIFEST
OS_AXIS: 155
OD_ADD: +1.50
OS_CYLINDER: -1.00
OD_AXIS: 015
OD_CYLINDER: -1.00
OS_ADD: +1.50
OS_SPHERE: -2.25
OD_SPHERE: -2.75

## 2023-11-21 ASSESSMENT — REFRACTION
OD_AXIS: 165
OS_ADD: +1.50
OS_CYLINDER: -1.00
OD_SPHERE: -2.75
OD_CYLINDER: -3.00
OD_ADD: +1.50
OS_SPHERE: -2.25
OS_AXIS: 155

## 2023-11-21 ASSESSMENT — CONF VISUAL FIELD
OD_INFERIOR_TEMPORAL_RESTRICTION: 0
OD_SUPERIOR_NASAL_RESTRICTION: 0
OS_INFERIOR_NASAL_RESTRICTION: 0
OS_NORMAL: 1
OS_SUPERIOR_TEMPORAL_RESTRICTION: 0
OS_SUPERIOR_NASAL_RESTRICTION: 0
OD_NORMAL: 1
OS_INFERIOR_TEMPORAL_RESTRICTION: 0
OD_SUPERIOR_TEMPORAL_RESTRICTION: 0
OD_INFERIOR_NASAL_RESTRICTION: 0

## 2023-11-21 ASSESSMENT — TONOMETRY
IOP_METHOD: GOLDMANN APPLANATION
OD_IOP_MMHG: 17
OS_IOP_MMHG: 17

## 2023-11-30 ASSESSMENT — EXTERNAL EXAM - LEFT EYE: OS_EXAM: NORMAL

## 2023-11-30 ASSESSMENT — SLIT LAMP EXAM - LIDS
COMMENTS: GOOD POSITION
COMMENTS: GOOD POSITION

## 2023-11-30 ASSESSMENT — EXTERNAL EXAM - RIGHT EYE: OD_EXAM: NORMAL

## 2023-12-01 ENCOUNTER — OFFICE VISIT (OUTPATIENT)
Dept: OPHTHALMOLOGY | Facility: CLINIC | Age: 48
End: 2023-12-01
Payer: COMMERCIAL

## 2023-12-01 DIAGNOSIS — H52.13 MYOPIA OF BOTH EYES: Primary | ICD-10-CM

## 2023-12-01 DIAGNOSIS — H52.4 PRESBYOPIA: ICD-10-CM

## 2023-12-01 DIAGNOSIS — H52.203 ASTIGMATISM OF BOTH EYES, UNSPECIFIED TYPE: ICD-10-CM

## 2023-12-01 DIAGNOSIS — E11.9 CONTROLLED TYPE 2 DIABETES MELLITUS WITHOUT COMPLICATION, WITHOUT LONG-TERM CURRENT USE OF INSULIN (MULTI): ICD-10-CM

## 2023-12-01 PROCEDURE — 92012 INTRM OPH EXAM EST PATIENT: CPT | Performed by: OPHTHALMOLOGY

## 2023-12-01 PROCEDURE — 92015 DETERMINE REFRACTIVE STATE: CPT | Performed by: OPHTHALMOLOGY

## 2023-12-01 ASSESSMENT — REFRACTION_MANIFEST
OS_CYLINDER: -1.50
OS_SPHERE: -2.25
OS_CYLINDER: -1.00
OD_SPHERE: -1.25
OD_AXIS: 180
OD_SPHERE: -1.25
OS_AXIS: 160
OD_SPHERE: -1.50
OS_CYLINDER: -1.50
OS_SPHERE: -2.50
OD_CYLINDER: -2.00
OS_AXIS: 160
OD_AXIS: 000
METHOD_AUTOREFRACTION: 1
OS_AXIS: 159
OD_AXIS: 005
OS_SPHERE: -2.50
OD_CYLINDER: -2.00
OD_CYLINDER: -2.00

## 2023-12-01 ASSESSMENT — VISUAL ACUITY
OD_SC: 20/40
OS_SC: 20/50
METHOD: SNELLEN - LINEAR

## 2023-12-01 ASSESSMENT — ENCOUNTER SYMPTOMS: EYES NEGATIVE: 1

## 2023-12-01 ASSESSMENT — TONOMETRY
OD_IOP_MMHG: 18
IOP_METHOD: GOLDMANN APPLANATION
OS_IOP_MMHG: 17

## 2023-12-01 NOTE — PROGRESS NOTES
Myopia of both eyes with [id number]  -Patient currently driving without glasses, did not fill Rx from last year.  -History of variable refraction at last office visit with progressive myopia over time. Patient denies recent hyperglycemia.   -New Rx given (DVO), patient should wear glasses when driving. Advised can take glasses off for reading for now and can consider progressives in the future as needed.     Diabetes eeffvizyB42.9  -OCT macula (12/1/23) - SS: 8/10 OU. Normal thickness and contour. Intact EZ OU. No edema OU. 240/234.   -HbA1c= 6.6 (10/4/23). No diabetic retinopathy seen on exam. Continue close monitoring of blood glucose, blood pressure, and cholesterol. Plan for annual dilated eye exam.        MRx 8/7/17:   OD: -0.50  -1.00  x 005  OS: -0.75  -0.75  x 150

## 2024-01-03 ENCOUNTER — LAB (OUTPATIENT)
Dept: LAB | Facility: CLINIC | Age: 49
End: 2024-01-03
Payer: COMMERCIAL

## 2024-01-03 DIAGNOSIS — D64.9 ANEMIA, UNSPECIFIED: ICD-10-CM

## 2024-01-03 DIAGNOSIS — N18.9 CHRONIC KIDNEY DISEASE, UNSPECIFIED: ICD-10-CM

## 2024-01-03 DIAGNOSIS — D63.8 ANEMIA IN OTHER CHRONIC DISEASES CLASSIFIED ELSEWHERE: Primary | ICD-10-CM

## 2024-01-03 LAB
BASOPHILS # BLD AUTO: 0.04 X10*3/UL (ref 0–0.1)
BASOPHILS NFR BLD AUTO: 0.7 %
EOSINOPHIL # BLD AUTO: 0.06 X10*3/UL (ref 0–0.7)
EOSINOPHIL NFR BLD AUTO: 1 %
ERYTHROCYTE [DISTWIDTH] IN BLOOD BY AUTOMATED COUNT: 14.2 % (ref 11.5–14.5)
FERRITIN SERPL-MCNC: 370 NG/ML (ref 20–300)
FOLATE SERPL-MCNC: >24 NG/ML
HCT VFR BLD AUTO: 37.6 % (ref 41–52)
HGB BLD-MCNC: 12.3 G/DL (ref 13.5–17.5)
HGB RETIC QN: 31 PG (ref 28–38)
IMM GRANULOCYTES # BLD AUTO: 0.03 X10*3/UL (ref 0–0.7)
IMM GRANULOCYTES NFR BLD AUTO: 0.5 % (ref 0–0.9)
IMMATURE RETIC FRACTION: 14.6 %
IRON SATN MFR SERPL: 15 % (ref 25–45)
IRON SERPL-MCNC: 67 UG/DL (ref 35–150)
LYMPHOCYTES # BLD AUTO: 2.25 X10*3/UL (ref 1.2–4.8)
LYMPHOCYTES NFR BLD AUTO: 39.1 %
MCH RBC QN AUTO: 27.6 PG (ref 26–34)
MCHC RBC AUTO-ENTMCNC: 32.7 G/DL (ref 32–36)
MCV RBC AUTO: 85 FL (ref 80–100)
MONOCYTES # BLD AUTO: 0.51 X10*3/UL (ref 0.1–1)
MONOCYTES NFR BLD AUTO: 8.9 %
NEUTROPHILS # BLD AUTO: 2.87 X10*3/UL (ref 1.2–7.7)
NEUTROPHILS NFR BLD AUTO: 49.8 %
NRBC BLD-RTO: ABNORMAL /100{WBCS}
PLATELET # BLD AUTO: 408 X10*3/UL (ref 150–450)
PROT SERPL-MCNC: 7.4 G/DL (ref 6.4–8.2)
RBC # BLD AUTO: 4.45 X10*6/UL (ref 4.5–5.9)
RETICS #: 0.07 X10*6/UL (ref 0.02–0.12)
RETICS/RBC NFR AUTO: 1.5 % (ref 0.5–2)
TIBC SERPL-MCNC: 437 UG/DL (ref 240–445)
UIBC SERPL-MCNC: 370 UG/DL (ref 110–370)
VIT B12 SERPL-MCNC: 503 PG/ML (ref 211–911)
WBC # BLD AUTO: 5.8 X10*3/UL (ref 4.4–11.3)

## 2024-01-03 PROCEDURE — 86334 IMMUNOFIX E-PHORESIS SERUM: CPT

## 2024-01-03 PROCEDURE — 82728 ASSAY OF FERRITIN: CPT

## 2024-01-03 PROCEDURE — 36415 COLL VENOUS BLD VENIPUNCTURE: CPT

## 2024-01-03 PROCEDURE — 84238 ASSAY NONENDOCRINE RECEPTOR: CPT

## 2024-01-03 PROCEDURE — 83521 IG LIGHT CHAINS FREE EACH: CPT

## 2024-01-03 PROCEDURE — 82607 VITAMIN B-12: CPT

## 2024-01-03 PROCEDURE — 85025 COMPLETE CBC W/AUTO DIFF WBC: CPT

## 2024-01-03 PROCEDURE — 85045 AUTOMATED RETICULOCYTE COUNT: CPT

## 2024-01-03 PROCEDURE — 84165 PROTEIN E-PHORESIS SERUM: CPT | Performed by: PATHOLOGY

## 2024-01-03 PROCEDURE — 84155 ASSAY OF PROTEIN SERUM: CPT

## 2024-01-03 PROCEDURE — 86320 SERUM IMMUNOELECTROPHORESIS: CPT | Performed by: PATHOLOGY

## 2024-01-03 PROCEDURE — 83540 ASSAY OF IRON: CPT

## 2024-01-03 PROCEDURE — 82746 ASSAY OF FOLIC ACID SERUM: CPT

## 2024-01-04 LAB
KAPPA LC SERPL-MCNC: 2.58 MG/DL (ref 0.33–1.94)
KAPPA LC/LAMBDA SER: 2.13 {RATIO} (ref 0.26–1.65)
LAMBDA LC SERPL-MCNC: 1.21 MG/DL (ref 0.57–2.63)

## 2024-01-05 LAB — STFR SERPL-MCNC: 2.4 MG/L (ref 2.2–5)

## 2024-01-08 ENCOUNTER — TELEPHONE (OUTPATIENT)
Dept: SCHEDULING | Age: 49
End: 2024-01-08
Payer: COMMERCIAL

## 2024-01-08 ENCOUNTER — TELEPHONE (OUTPATIENT)
Dept: HEMATOLOGY/ONCOLOGY | Facility: CLINIC | Age: 49
End: 2024-01-08
Payer: COMMERCIAL

## 2024-01-08 LAB
ALBUMIN: 4.6 G/DL (ref 3.4–5)
ALPHA 1 GLOBULIN: 0.3 G/DL (ref 0.2–0.6)
ALPHA 2 GLOBULIN: 0.3 G/DL (ref 0.4–1.1)
BETA GLOBULIN: 0.9 G/DL (ref 0.5–1.2)
GAMMA GLOBULIN: 1.3 G/DL (ref 0.5–1.4)
IMMUNOFIXATION COMMENT: ABNORMAL
PATH REVIEW - SERUM IMMUNOFIXATION: ABNORMAL
PATH REVIEW-SERUM PROTEIN ELECTROPHORESIS: ABNORMAL
PROTEIN ELECTROPHORESIS COMMENT: ABNORMAL

## 2024-01-08 NOTE — TELEPHONE ENCOUNTER
Noted patient scheduled for follow-up 1/16 as virtual.  This visit should be an in person - Established new as patient has not been seen by provider yet.  Noted patient lives in [place].  Call placed to patient to discuss.  No answer.  Message left on non-identifiable voicemail requesting call back.

## 2024-01-08 NOTE — TELEPHONE ENCOUNTER
Patient returned missed call regarding upcoming appt. He stated he did not have in person visits with [facility]. He requested to be scheduled with a physician at [facility]. I transferred him per his request to [facility] scheduling team for assistance in rescheduling. He declined taking down the phone number before transferring the call.

## 2024-01-10 DIAGNOSIS — I10 ESSENTIAL (PRIMARY) HYPERTENSION: ICD-10-CM

## 2024-01-10 RX ORDER — LOSARTAN POTASSIUM 100 MG/1
100 TABLET ORAL DAILY
Qty: 90 TABLET | Refills: 1 | Status: SHIPPED | OUTPATIENT
Start: 2024-01-10 | End: 2024-02-06 | Stop reason: SDUPTHER

## 2024-01-16 ENCOUNTER — APPOINTMENT (OUTPATIENT)
Dept: HEMATOLOGY/ONCOLOGY | Facility: CLINIC | Age: 49
End: 2024-01-16
Payer: COMMERCIAL

## 2024-01-17 ENCOUNTER — APPOINTMENT (OUTPATIENT)
Dept: HEMATOLOGY/ONCOLOGY | Facility: CLINIC | Age: 49
End: 2024-01-17
Payer: COMMERCIAL

## 2024-01-25 ENCOUNTER — OFFICE VISIT (OUTPATIENT)
Dept: HEMATOLOGY/ONCOLOGY | Facility: HOSPITAL | Age: 49
End: 2024-01-25
Payer: COMMERCIAL

## 2024-01-25 DIAGNOSIS — D64.9 ANEMIA, UNSPECIFIED TYPE: Primary | ICD-10-CM

## 2024-01-25 PROCEDURE — 4010F ACE/ARB THERAPY RXD/TAKEN: CPT | Performed by: PHYSICIAN ASSISTANT

## 2024-01-25 PROCEDURE — 99213 OFFICE O/P EST LOW 20 MIN: CPT | Performed by: PHYSICIAN ASSISTANT

## 2024-01-25 PROCEDURE — 1036F TOBACCO NON-USER: CPT | Performed by: PHYSICIAN ASSISTANT

## 2024-01-25 NOTE — PROGRESS NOTES
Patient ID: James Esqueda is a 48 y.o. male.  Referring Physician: No referring provider defined for this encounter.  Primary Care Provider: Duke Polanco MD  Visit Type: Follow Up - Transfer of Care    Location: [facility] - Main  Diagnosis/Reason: Multifactorial Anemia - CABRERA (2/2 Malabsortion), Anemia of Chronic Dx    Interval Hx  1/25/24  James Esqueda is a 48 y.o. male following up today for transfer of care for multifactorial anemia d/t CABRERA (2/2 malabsorption) & anemia of chronic disease    NOTE  1/25/24 - Patient was previously followed by LILY Neely with their last visit being 7/5/23. DAI Dominguez noted patient's multifactorial anemia d/t CABRERA (2/2 malabsorption) & anemia of chronic disease. She also noted his hx of HLD, HTN, Bipolar, Schizophrenia, Depression, Myopathy, GERD. DAI Dominguez recommended FUV in 6 months w/ labs    Patient denies weight loss, abnormal bruising and bleeding, hematuria, blood in stool, dark/black stools, epistaxis, oral/gingival bleeding, lymphadenopathy, recurrent infections, recurrent fevers, night sweats, early satiety, abdominal pain, bone pain, chest pain, palpitations, SOB, HERNANDEZ, fatigue, dizziness, lightheadedness, PICA.    Relevant Hx  7/5/23 - Last Visit w/ LILY Neely  Mr. Esqueda is a 49 yo patient with a PMH of DMII, CKD, HTN, Hyperlipidemia, Bipolar, Schizophrenia, Depression, Myopathy, and GERD. He was referred to benign hematology for consultation of anemia. Referred by Dr. Calhoun.     Today, patient presents for followup to review labs from last week. Reports feeling fell overall. Started Pantoprazole once daily for his epigastric stomach pains and notes it has improved. No abnormal bruising or bleeding. Notes a couple episodes of  dark stool.  Has been off of oral iron and vit c since last appt about 3mo ago.     Denies abnormal weight loss, night sweats, fever. Denies chills, sob, v/d/c. No PICA. Denies any other abnormal bleeding or bruising (hx GI bld over  2.5yrs ago). No recurrent infections. Has had surgery in past w/o issue. Never had blood/blood products.     PMHx:  Active Ambulatory Problems     Diagnosis Date Noted    Controlled type 2 diabetes mellitus without complication, without long-term current use of insulin (CMS/HCC) 04/18/2023    Bipolar affective disorder (CMS/HCC) 04/18/2023    Hyperlipidemia 04/18/2023    Hypertension 04/18/2023    Myopia of both eyes 04/18/2023    Schizophrenia (CMS/HCC) 04/18/2023    Abdominal cramping 10/18/2023    Anemia 10/18/2023    Chronic cough 10/18/2023    Chronic low back pain 10/18/2023    Finger tendinitis 10/18/2023    Astigmatism of both eyes 11/21/2023    Presbyopia 11/21/2023     Resolved Ambulatory Problems     Diagnosis Date Noted    Depression 04/18/2023    Erosive gastropathy 04/18/2023    H/O colonoscopy 04/18/2023    History of esophagogastroduodenoscopy (EGD) 04/18/2023    Lower GI bleed 04/18/2023    Colon polyp 04/18/2023     Past Medical History:   Diagnosis Date    Bitten or stung by nonvenomous insect and other nonvenomous arthropods, initial encounter 10/08/2015    Personal history of other infectious and parasitic diseases     Personal history of other infectious and parasitic diseases     Personal history of other infectious and parasitic diseases     Prediabetes 12/08/2016    Prediabetes 12/08/2016    Prediabetes 12/08/2016    Sprain of interphalangeal joint of unspecified finger, initial encounter 05/01/2018       PSHx:  Past Surgical History:   Procedure Laterality Date    OTHER SURGICAL HISTORY  09/18/2015    Dental Surgery      Right arm fx repair  Barker teeth extractions    FHx:  Family History   Problem Relation Name Age of Onset    No Known Problems Mother      No Known Problems Father        Mother: Anemia, Heart Disease  Father: Anemia    Social Hx:  James Esqueda    reports that he has never smoked. He has never used smokeless tobacco.  He  reports no history of alcohol use.  He  reports no  history of drug use.  Social History     Socioeconomic History    Marital status: Single     Spouse name: Not on file    Number of children: Not on file    Years of education: Not on file    Highest education level: Not on file   Occupational History    Not on file   Tobacco Use    Smoking status: Never    Smokeless tobacco: Never   Vaping Use    Vaping Use: Never used   Substance and Sexual Activity    Alcohol use: Never    Drug use: Never    Sexual activity: Not on file   Other Topics Concern    Not on file   Social History Narrative    Not on file     Social Determinants of Health     Financial Resource Strain: Not on file   Food Insecurity: Not on file   Transportation Needs: Not on file   Physical Activity: Not on file   Stress: Not on file   Social Connections: Not on file   Intimate Partner Violence: Not on file   Housing Stability: Not on file     Alcohol Use: Denies  Smoking: Denies  Recreational Drug Use: Denies  Special Diets: Regular    Cancer Screenings:  Upper EGD: 6/2016 - Non-bleeding erosive gastropathy, hiatal hernia  Colonoscopy: 2/2019 - Polyps, non-bleeding hemorrhoids   Lung cancer screenings: N/A - Never smoker    Medications and allergies reviewed in EMR.    ROS:  Review of Systems - Oncology   10 point review of systems negative except as state in HPI.    Vitals & Statistics:  Objective   BSA: There is no height or weight on file to calculate BSA.  There were no vitals taken for this visit.    Physical Exam:  Physical Exam  N/A - Virtual visit     Results:  Lab Results   Component Value Date    WBC 5.8 01/03/2024    NEUTROABS 2.87 01/03/2024    IGABSOL 0.03 01/03/2024    LYMPHSABS 2.25 01/03/2024    MONOSABS 0.51 01/03/2024    EOSABS 0.06 01/03/2024    BASOSABS 0.04 01/03/2024    RBC 4.45 (L) 01/03/2024    MCV 85 01/03/2024    MCHC 32.7 01/03/2024    HGB 12.3 (L) 01/03/2024    HCT 37.6 (L) 01/03/2024     01/03/2024     Lab Results   Component Value Date    RETICCTPCT 1.5 01/03/2024     "  Lab Results   Component Value Date    CREATININE 1.29 10/04/2023    BUN 20 10/04/2023    EGFR 69 10/04/2023     10/04/2023    K 4.6 10/04/2023     10/04/2023    CO2 28 10/04/2023      Lab Results   Component Value Date    ALT 17 10/04/2023    AST 14 10/04/2023    ALKPHOS 43 10/04/2023    BILITOT 0.4 10/04/2023      Lab Results   Component Value Date    TSH 1.31 02/15/2023     Lab Results   Component Value Date    TSH 1.31 02/15/2023    R5YFGPE 103 02/22/2022    A1LIDYE 9.1 02/22/2022     Lab Results   Component Value Date    IRON 67 01/03/2024    TIBC 437 01/03/2024    FERRITIN 370 (H) 01/03/2024      Lab Results   Component Value Date    YOGDOOXB03 503 01/03/2024      Lab Results   Component Value Date    FOLATE >24.0 01/03/2024     Lab Results   Component Value Date    RAMONA POSITIVE (A) 12/28/2020    RF <10 12/28/2020    SEDRATE 2 12/28/2020      No results found for: \"CRP\"   No results found for: \"MERA\"  Lab Results   Component Value Date     03/29/2023     Lab Results   Component Value Date    HAPTOGLOBIN <30 12/28/2020     Lab Results   Component Value Date    SPEP Normal.    01/03/2024     No results found for: \"IGG\", \"IGM\", \"IGA\"  No results found for: \"HEPATOT\", \"HEPAIGM\", \"HEPBCIGM\", \"HEPBCAB\", \"HEPBSAG\", \"HEPCAB\"  No results found for: \"HIV1X2\"    Assessment:  James Esqueda is a 48 y.o. male following up today for multifactorial anemia d/t CABRERA (2/2 malabsorption) and anemia of chronic disease    I reviewed patient's chart including but not limited to labs, imaging, surgical/procedure notes, pathology, hospital notes, doctor's notes.    1/3/24 results:  WBC count & differential WNL  Normocytic, normochromic anemia w/ Hb at 12.3 - Hct low & RBC count low - RDW WNL  Platelets WNL  Iron studies: Ferritin elevated at 370, Iron and TIBC WNL, %Sat low at 15% - Ferritin likely elevated 2/2 inflammation, patient NOT iron deficient at this time    Plan:  Multifactorial Anemia - CABRERA (2/2 " Malabsorption), Anemia of Chronic Disease  RTC in 6 months via in-person visit - F/U sooner if needed/urgent  CBC-D, CMP, EPO, Iron, B12, Folate, ESR, CRP up to 1 week prior    I had an extensive discussion with the patient regarding the diagnosis and discussed the plan of therapy, including general considerations regarding side effects and outcomes. Pt understood and gave appropriate teach back about the plan of care. All questions were answered to the patient's satisfaction. The patient is instructed to contact us at any time if questions or problems arise. Thank you for the opportunity to participate in the care of this very pleasant patient.    Total time = 30 minutes. 50% or more of this time was spent in counseling and/or coordination of care including reviewing medical history/radiology/labs, examining patient, formulating outlined plan with team, and discussing plan with patient/family.      Pancho Loera PA-C

## 2024-02-06 ENCOUNTER — OFFICE VISIT (OUTPATIENT)
Dept: PRIMARY CARE | Facility: CLINIC | Age: 49
End: 2024-02-06
Payer: COMMERCIAL

## 2024-02-06 VITALS
BODY MASS INDEX: 40.83 KG/M2 | HEART RATE: 73 BPM | DIASTOLIC BLOOD PRESSURE: 70 MMHG | HEIGHT: 70 IN | SYSTOLIC BLOOD PRESSURE: 118 MMHG | WEIGHT: 285.2 LBS | TEMPERATURE: 98.9 F

## 2024-02-06 DIAGNOSIS — E11.9 CONTROLLED TYPE 2 DIABETES MELLITUS WITHOUT COMPLICATION, WITHOUT LONG-TERM CURRENT USE OF INSULIN (MULTI): Primary | ICD-10-CM

## 2024-02-06 DIAGNOSIS — E11.9 DIABETES MELLITUS TYPE 2 WITHOUT RETINOPATHY (MULTI): ICD-10-CM

## 2024-02-06 DIAGNOSIS — E11.65 TYPE 2 DIABETES MELLITUS WITH HYPERGLYCEMIA (MULTI): ICD-10-CM

## 2024-02-06 DIAGNOSIS — I10 PRIMARY HYPERTENSION: ICD-10-CM

## 2024-02-06 DIAGNOSIS — K31.89 EROSIVE GASTROPATHY: ICD-10-CM

## 2024-02-06 DIAGNOSIS — I10 ESSENTIAL (PRIMARY) HYPERTENSION: ICD-10-CM

## 2024-02-06 DIAGNOSIS — E78.2 MIXED HYPERLIPIDEMIA: ICD-10-CM

## 2024-02-06 LAB — POC HEMOGLOBIN A1C: 6.4 % (ref 4.2–6.5)

## 2024-02-06 PROCEDURE — 4010F ACE/ARB THERAPY RXD/TAKEN: CPT | Performed by: INTERNAL MEDICINE

## 2024-02-06 PROCEDURE — 3074F SYST BP LT 130 MM HG: CPT | Performed by: INTERNAL MEDICINE

## 2024-02-06 PROCEDURE — 1036F TOBACCO NON-USER: CPT | Performed by: INTERNAL MEDICINE

## 2024-02-06 PROCEDURE — 3078F DIAST BP <80 MM HG: CPT | Performed by: INTERNAL MEDICINE

## 2024-02-06 PROCEDURE — 83036 HEMOGLOBIN GLYCOSYLATED A1C: CPT | Performed by: INTERNAL MEDICINE

## 2024-02-06 PROCEDURE — 99214 OFFICE O/P EST MOD 30 MIN: CPT | Performed by: INTERNAL MEDICINE

## 2024-02-06 RX ORDER — LOSARTAN POTASSIUM 100 MG/1
100 TABLET ORAL DAILY
Qty: 90 TABLET | Refills: 3 | Status: SHIPPED | OUTPATIENT
Start: 2024-02-06 | End: 2024-06-06 | Stop reason: SDUPTHER

## 2024-02-06 RX ORDER — AMLODIPINE BESYLATE 2.5 MG/1
2.5 TABLET ORAL DAILY
Qty: 90 TABLET | Refills: 3 | Status: SHIPPED | OUTPATIENT
Start: 2024-02-06 | End: 2024-06-06 | Stop reason: SDUPTHER

## 2024-02-06 RX ORDER — ATORVASTATIN CALCIUM 20 MG/1
20 TABLET, FILM COATED ORAL NIGHTLY
Qty: 90 TABLET | Refills: 3 | Status: SHIPPED | OUTPATIENT
Start: 2024-02-06 | End: 2024-06-06 | Stop reason: SDUPTHER

## 2024-02-06 RX ORDER — PANTOPRAZOLE SODIUM 40 MG/1
40 TABLET, DELAYED RELEASE ORAL DAILY
Qty: 90 TABLET | Refills: 3 | Status: SHIPPED | OUTPATIENT
Start: 2024-02-06 | End: 2024-06-06 | Stop reason: SDUPTHER

## 2024-02-06 RX ORDER — FENOFIBRATE 160 MG/1
160 TABLET ORAL DAILY
Qty: 90 TABLET | Refills: 3 | Status: SHIPPED | OUTPATIENT
Start: 2024-02-06 | End: 2024-06-06 | Stop reason: SDUPTHER

## 2024-02-06 RX ORDER — METFORMIN HYDROCHLORIDE 500 MG/1
1000 TABLET, EXTENDED RELEASE ORAL DAILY
Qty: 180 TABLET | Refills: 3 | Status: SHIPPED | OUTPATIENT
Start: 2024-02-06 | End: 2024-06-06 | Stop reason: SDUPTHER

## 2024-02-06 RX ORDER — TRIAMTERENE/HYDROCHLOROTHIAZID 37.5-25 MG
1 TABLET ORAL DAILY
Qty: 90 TABLET | Refills: 3 | Status: SHIPPED | OUTPATIENT
Start: 2024-02-06 | End: 2024-06-06 | Stop reason: SDUPTHER

## 2024-02-06 RX ORDER — GLIMEPIRIDE 2 MG/1
6 TABLET ORAL DAILY
Qty: 270 TABLET | Refills: 3 | Status: SHIPPED | OUTPATIENT
Start: 2024-02-06 | End: 2024-04-10 | Stop reason: SDUPTHER

## 2024-02-06 ASSESSMENT — ENCOUNTER SYMPTOMS
POLYDIPSIA: 0
SHORTNESS OF BREATH: 0
CHILLS: 0
FEVER: 0
COUGH: 0
PALPITATIONS: 0

## 2024-02-06 NOTE — PROGRESS NOTES
"Subjective   Patient ID: James Esqueda is a 48 y.o. male who presents for Follow-up.    PT arrives for routine DM follow up. No major concerns today. Denies polyuria or polydipsia. 2 eye exams in last 1 year. Possible hypoglycemia events 3 times a week, around dinner time. Feels a little off, and improves with eating.     Mild water sensation in the left ear with mild reduced hearing.  Some mild posterior deep impacted cerumen cleared easily with a little bit of flushing.  Patient tolerated well with no complications or concerns.  No signs of trauma or damage at the conclusion of the procedure.      Lab work preordered for follow-up    Glimepiride reduced to 6 mg daily to reduce risk of hypoglycemia.         Review of Systems   Constitutional:  Negative for chills and fever.   Respiratory:  Negative for cough and shortness of breath.    Cardiovascular:  Negative for chest pain and palpitations.   Endocrine: Negative for polydipsia and polyuria.       Objective   /70 (BP Location: Left arm, Patient Position: Sitting, BP Cuff Size: Large adult)   Pulse 73   Temp 37.2 °C (98.9 °F) (Temporal)   Ht 1.778 m (5' 10\")   Wt 129 kg (285 lb 3.2 oz)   BMI 40.92 kg/m²     Physical Exam  Constitutional:       Appearance: Normal appearance.   HENT:      Head: Normocephalic and atraumatic.   Eyes:      Extraocular Movements: Extraocular movements intact.      Pupils: Pupils are equal, round, and reactive to light.   Neck:      Thyroid: No thyroid mass or thyromegaly.      Vascular: No carotid bruit.   Cardiovascular:      Rate and Rhythm: Normal rate and regular rhythm.      Heart sounds: No murmur heard.     No friction rub. No gallop.   Pulmonary:      Effort: No respiratory distress.      Breath sounds: No wheezing, rhonchi or rales.   Musculoskeletal:      Cervical back: Neck supple.      Right lower leg: No edema.      Left lower leg: No edema.   Lymphadenopathy:      Cervical: No cervical adenopathy.   Neurological: "      Mental Status: He is alert.         Assessment/Plan   Problem List Items Addressed This Visit             ICD-10-CM    Controlled type 2 diabetes mellitus without complication, without long-term current use of insulin (CMS/[facility]) - Primary E11.9    Relevant Medications    atorvastatin (Lipitor) 20 mg tablet    Other Relevant Orders    POCT glycosylated hemoglobin (Hb A1C) manually resulted (Completed)    Albumin , Urine Random    Comprehensive Metabolic Panel    Lipid Panel    Hemoglobin A1C    Hyperlipidemia E78.5    Relevant Medications    fenofibrate (Triglide) 160 mg tablet    Other Relevant Orders    Albumin , Urine Random    Comprehensive Metabolic Panel    Lipid Panel    Hemoglobin A1C    Hypertension I10    Relevant Medications    amLODIPine (Norvasc) 2.5 mg tablet    triamterene-hydrochlorothiazid (Maxzide-25) 37.5-25 mg tablet    Other Relevant Orders    Albumin , Urine Random    Comprehensive Metabolic Panel    Lipid Panel    Hemoglobin A1C     Other Visit Diagnoses         Codes    Diabetes mellitus type 2 without retinopathy (CMS/[facility])     E11.9    Relevant Medications    atorvastatin (Lipitor) 20 mg tablet    Other Relevant Orders    Albumin , Urine Random    Comprehensive Metabolic Panel    Lipid Panel    Hemoglobin A1C    Type 2 diabetes mellitus with hyperglycemia (CMS/[facility])     E11.65    Relevant Medications    glimepiride (Amaryl) 2 mg tablet    metFORMIN  mg 24 hr tablet    Essential (primary) hypertension     I10    Relevant Medications    losartan (Cozaar) 100 mg tablet    SITagliptin phosphate (Januvia) 100 mg tablet    Erosive gastropathy     K31.89    Relevant Medications    pantoprazole (ProtoNix) 40 mg EC tablet

## 2024-02-15 ENCOUNTER — LAB (OUTPATIENT)
Dept: LAB | Facility: LAB | Age: 49
End: 2024-02-15
Payer: COMMERCIAL

## 2024-02-15 DIAGNOSIS — Z79.899 OTHER LONG TERM (CURRENT) DRUG THERAPY: Primary | ICD-10-CM

## 2024-02-15 LAB
ALBUMIN SERPL BCP-MCNC: 4.8 G/DL (ref 3.4–5)
ALP SERPL-CCNC: 49 U/L (ref 33–120)
ALT SERPL W P-5'-P-CCNC: 21 U/L (ref 10–52)
ANION GAP SERPL CALC-SCNC: 16 MMOL/L (ref 10–20)
AST SERPL W P-5'-P-CCNC: 20 U/L (ref 9–39)
BASOPHILS # BLD AUTO: 0.03 X10*3/UL (ref 0–0.1)
BASOPHILS NFR BLD AUTO: 0.4 %
BILIRUB SERPL-MCNC: 0.5 MG/DL (ref 0–1.2)
BUN SERPL-MCNC: 20 MG/DL (ref 6–23)
CALCIUM SERPL-MCNC: 10.2 MG/DL (ref 8.6–10.6)
CHLORIDE SERPL-SCNC: 100 MMOL/L (ref 98–107)
CHOLEST SERPL-MCNC: 143 MG/DL (ref 0–199)
CHOLESTEROL/HDL RATIO: 3.1
CO2 SERPL-SCNC: 27 MMOL/L (ref 21–32)
CREAT SERPL-MCNC: 1.26 MG/DL (ref 0.5–1.3)
EGFRCR SERPLBLD CKD-EPI 2021: 71 ML/MIN/1.73M*2
EOSINOPHIL # BLD AUTO: 0.07 X10*3/UL (ref 0–0.7)
EOSINOPHIL NFR BLD AUTO: 1 %
ERYTHROCYTE [DISTWIDTH] IN BLOOD BY AUTOMATED COUNT: 13.9 % (ref 11.5–14.5)
GLUCOSE SERPL-MCNC: 95 MG/DL (ref 74–99)
HCT VFR BLD AUTO: 37.3 % (ref 41–52)
HDLC SERPL-MCNC: 46.7 MG/DL
HGB BLD-MCNC: 12.2 G/DL (ref 13.5–17.5)
IMM GRANULOCYTES # BLD AUTO: 0.05 X10*3/UL (ref 0–0.7)
IMM GRANULOCYTES NFR BLD AUTO: 0.7 % (ref 0–0.9)
LDLC SERPL CALC-MCNC: 77 MG/DL
LYMPHOCYTES # BLD AUTO: 2.01 X10*3/UL (ref 1.2–4.8)
LYMPHOCYTES NFR BLD AUTO: 28.9 %
MCH RBC QN AUTO: 27.1 PG (ref 26–34)
MCHC RBC AUTO-ENTMCNC: 32.7 G/DL (ref 32–36)
MCV RBC AUTO: 83 FL (ref 80–100)
MONOCYTES # BLD AUTO: 0.5 X10*3/UL (ref 0.1–1)
MONOCYTES NFR BLD AUTO: 7.2 %
NEUTROPHILS # BLD AUTO: 4.3 X10*3/UL (ref 1.2–7.7)
NEUTROPHILS NFR BLD AUTO: 61.8 %
NON HDL CHOLESTEROL: 96 MG/DL (ref 0–149)
NRBC BLD-RTO: 0 /100 WBCS (ref 0–0)
PLATELET # BLD AUTO: 414 X10*3/UL (ref 150–450)
POTASSIUM SERPL-SCNC: 4.3 MMOL/L (ref 3.5–5.3)
PROLACTIN SERPL-MCNC: 2.2 UG/L (ref 2–18)
PROT SERPL-MCNC: 7.7 G/DL (ref 6.4–8.2)
RBC # BLD AUTO: 4.5 X10*6/UL (ref 4.5–5.9)
SODIUM SERPL-SCNC: 139 MMOL/L (ref 136–145)
TRIGL SERPL-MCNC: 96 MG/DL (ref 0–149)
TSH SERPL-ACNC: 1.08 MIU/L (ref 0.44–3.98)
VIT B12 SERPL-MCNC: 700 PG/ML (ref 211–911)
VLDL: 19 MG/DL (ref 0–40)
WBC # BLD AUTO: 7 X10*3/UL (ref 4.4–11.3)

## 2024-02-15 PROCEDURE — 80061 LIPID PANEL: CPT

## 2024-02-15 PROCEDURE — 85025 COMPLETE CBC W/AUTO DIFF WBC: CPT

## 2024-02-15 PROCEDURE — 84443 ASSAY THYROID STIM HORMONE: CPT

## 2024-02-15 PROCEDURE — 84146 ASSAY OF PROLACTIN: CPT

## 2024-02-15 PROCEDURE — 82746 ASSAY OF FOLIC ACID SERUM: CPT

## 2024-02-15 PROCEDURE — 82607 VITAMIN B-12: CPT

## 2024-02-15 PROCEDURE — 80053 COMPREHEN METABOLIC PANEL: CPT

## 2024-02-15 PROCEDURE — 36415 COLL VENOUS BLD VENIPUNCTURE: CPT

## 2024-02-16 LAB — FOLATE SERPL-MCNC: >24 NG/ML

## 2024-04-10 DIAGNOSIS — E11.65 TYPE 2 DIABETES MELLITUS WITH HYPERGLYCEMIA (MULTI): ICD-10-CM

## 2024-04-10 RX ORDER — GLIMEPIRIDE 2 MG/1
6 TABLET ORAL DAILY
Qty: 270 TABLET | Refills: 3 | Status: SHIPPED | OUTPATIENT
Start: 2024-04-10 | End: 2024-06-06 | Stop reason: SDUPTHER

## 2024-05-06 ENCOUNTER — LAB (OUTPATIENT)
Dept: LAB | Facility: LAB | Age: 49
End: 2024-05-06
Payer: COMMERCIAL

## 2024-05-06 DIAGNOSIS — E11.9 CONTROLLED TYPE 2 DIABETES MELLITUS WITHOUT COMPLICATION, WITHOUT LONG-TERM CURRENT USE OF INSULIN (MULTI): ICD-10-CM

## 2024-05-06 DIAGNOSIS — E11.9 DIABETES MELLITUS TYPE 2 WITHOUT RETINOPATHY (MULTI): ICD-10-CM

## 2024-05-06 DIAGNOSIS — I10 PRIMARY HYPERTENSION: ICD-10-CM

## 2024-05-06 DIAGNOSIS — E78.2 MIXED HYPERLIPIDEMIA: ICD-10-CM

## 2024-05-06 PROCEDURE — 36415 COLL VENOUS BLD VENIPUNCTURE: CPT

## 2024-05-06 PROCEDURE — 83036 HEMOGLOBIN GLYCOSYLATED A1C: CPT

## 2024-05-06 PROCEDURE — 80053 COMPREHEN METABOLIC PANEL: CPT

## 2024-05-06 PROCEDURE — 80061 LIPID PANEL: CPT

## 2024-05-06 PROCEDURE — 82570 ASSAY OF URINE CREATININE: CPT

## 2024-05-06 PROCEDURE — 82043 UR ALBUMIN QUANTITATIVE: CPT

## 2024-05-07 LAB
ALBUMIN SERPL BCP-MCNC: 4.8 G/DL (ref 3.4–5)
ALP SERPL-CCNC: 38 U/L (ref 33–120)
ALT SERPL W P-5'-P-CCNC: 10 U/L (ref 10–52)
ANION GAP SERPL CALC-SCNC: 13 MMOL/L (ref 10–20)
AST SERPL W P-5'-P-CCNC: 10 U/L (ref 9–39)
BILIRUB SERPL-MCNC: 0.5 MG/DL (ref 0–1.2)
BUN SERPL-MCNC: 19 MG/DL (ref 6–23)
CALCIUM SERPL-MCNC: 9.9 MG/DL (ref 8.6–10.6)
CHLORIDE SERPL-SCNC: 101 MMOL/L (ref 98–107)
CHOLEST SERPL-MCNC: 139 MG/DL (ref 0–199)
CHOLESTEROL/HDL RATIO: 3.3
CO2 SERPL-SCNC: 29 MMOL/L (ref 21–32)
CREAT SERPL-MCNC: 1.35 MG/DL (ref 0.5–1.3)
CREAT UR-MCNC: 203.9 MG/DL (ref 20–370)
EGFRCR SERPLBLD CKD-EPI 2021: 66 ML/MIN/1.73M*2
EST. AVERAGE GLUCOSE BLD GHB EST-MCNC: 126 MG/DL
GLUCOSE SERPL-MCNC: 109 MG/DL (ref 74–99)
HBA1C MFR BLD: 6 %
HDLC SERPL-MCNC: 42.4 MG/DL
LDLC SERPL CALC-MCNC: 81 MG/DL
MICROALBUMIN UR-MCNC: <7 MG/L
MICROALBUMIN/CREAT UR: NORMAL MG/G{CREAT}
NON HDL CHOLESTEROL: 97 MG/DL (ref 0–149)
POTASSIUM SERPL-SCNC: 4.1 MMOL/L (ref 3.5–5.3)
PROT SERPL-MCNC: 7.6 G/DL (ref 6.4–8.2)
SODIUM SERPL-SCNC: 139 MMOL/L (ref 136–145)
TRIGL SERPL-MCNC: 80 MG/DL (ref 0–149)
VLDL: 16 MG/DL (ref 0–40)

## 2024-06-06 ENCOUNTER — OFFICE VISIT (OUTPATIENT)
Dept: PRIMARY CARE | Facility: CLINIC | Age: 49
End: 2024-06-06
Payer: COMMERCIAL

## 2024-06-06 VITALS
BODY MASS INDEX: 40.18 KG/M2 | DIASTOLIC BLOOD PRESSURE: 67 MMHG | WEIGHT: 280 LBS | SYSTOLIC BLOOD PRESSURE: 112 MMHG | HEART RATE: 78 BPM

## 2024-06-06 DIAGNOSIS — K31.89 EROSIVE GASTROPATHY: ICD-10-CM

## 2024-06-06 DIAGNOSIS — Z12.11 ENCOUNTER FOR SCREENING FOR MALIGNANT NEOPLASM OF COLON: Primary | ICD-10-CM

## 2024-06-06 DIAGNOSIS — I10 PRIMARY HYPERTENSION: ICD-10-CM

## 2024-06-06 DIAGNOSIS — E78.2 MIXED HYPERLIPIDEMIA: ICD-10-CM

## 2024-06-06 DIAGNOSIS — I10 ESSENTIAL (PRIMARY) HYPERTENSION: ICD-10-CM

## 2024-06-06 DIAGNOSIS — E11.65 TYPE 2 DIABETES MELLITUS WITH HYPERGLYCEMIA (MULTI): ICD-10-CM

## 2024-06-06 DIAGNOSIS — E11.9 DIABETES MELLITUS TYPE 2 WITHOUT RETINOPATHY (MULTI): ICD-10-CM

## 2024-06-06 PROCEDURE — 1036F TOBACCO NON-USER: CPT | Performed by: INTERNAL MEDICINE

## 2024-06-06 PROCEDURE — 3062F POS MACROALBUMINURIA REV: CPT | Performed by: INTERNAL MEDICINE

## 2024-06-06 PROCEDURE — 3078F DIAST BP <80 MM HG: CPT | Performed by: INTERNAL MEDICINE

## 2024-06-06 PROCEDURE — 3074F SYST BP LT 130 MM HG: CPT | Performed by: INTERNAL MEDICINE

## 2024-06-06 PROCEDURE — 3044F HG A1C LEVEL LT 7.0%: CPT | Performed by: INTERNAL MEDICINE

## 2024-06-06 PROCEDURE — 3048F LDL-C <100 MG/DL: CPT | Performed by: INTERNAL MEDICINE

## 2024-06-06 PROCEDURE — 4010F ACE/ARB THERAPY RXD/TAKEN: CPT | Performed by: INTERNAL MEDICINE

## 2024-06-06 PROCEDURE — 99214 OFFICE O/P EST MOD 30 MIN: CPT | Performed by: INTERNAL MEDICINE

## 2024-06-06 RX ORDER — TRIAMTERENE/HYDROCHLOROTHIAZID 37.5-25 MG
1 TABLET ORAL DAILY
Qty: 90 TABLET | Refills: 3 | Status: SHIPPED | OUTPATIENT
Start: 2024-06-06 | End: 2025-06-06

## 2024-06-06 RX ORDER — GLIMEPIRIDE 2 MG/1
6 TABLET ORAL DAILY
Qty: 270 TABLET | Refills: 3 | Status: SHIPPED | OUTPATIENT
Start: 2024-06-06 | End: 2025-06-06

## 2024-06-06 RX ORDER — METFORMIN HYDROCHLORIDE 500 MG/1
1000 TABLET, EXTENDED RELEASE ORAL DAILY
Qty: 180 TABLET | Refills: 3 | Status: SHIPPED | OUTPATIENT
Start: 2024-06-06

## 2024-06-06 RX ORDER — PANTOPRAZOLE SODIUM 40 MG/1
40 TABLET, DELAYED RELEASE ORAL DAILY
Qty: 90 TABLET | Refills: 3 | Status: SHIPPED | OUTPATIENT
Start: 2024-06-06

## 2024-06-06 RX ORDER — LOSARTAN POTASSIUM 100 MG/1
100 TABLET ORAL DAILY
Qty: 90 TABLET | Refills: 3 | Status: SHIPPED | OUTPATIENT
Start: 2024-06-06

## 2024-06-06 RX ORDER — FENOFIBRATE 160 MG/1
160 TABLET ORAL DAILY
Qty: 90 TABLET | Refills: 3 | Status: SHIPPED | OUTPATIENT
Start: 2024-06-06 | End: 2025-06-06

## 2024-06-06 RX ORDER — AMLODIPINE BESYLATE 2.5 MG/1
2.5 TABLET ORAL DAILY
Qty: 90 TABLET | Refills: 3 | Status: SHIPPED | OUTPATIENT
Start: 2024-06-06 | End: 2025-06-06

## 2024-06-06 RX ORDER — ATORVASTATIN CALCIUM 20 MG/1
20 TABLET, FILM COATED ORAL NIGHTLY
Qty: 90 TABLET | Refills: 3 | Status: SHIPPED | OUTPATIENT
Start: 2024-06-06 | End: 2025-06-06

## 2024-06-06 ASSESSMENT — ENCOUNTER SYMPTOMS
CHILLS: 0
POLYDIPSIA: 0
COUGH: 0
FEVER: 0
PALPITATIONS: 0
SHORTNESS OF BREATH: 0

## 2024-06-06 NOTE — PROGRESS NOTES
Subjective   Patient ID: James Esqueda is a 49 y.o. male who presents for Follow-up.    Mr. Esqueda is a 49-year-old male who is a well-controlled type II diabetic who presents today for his routine 4-month follow-up.  He completed his blood work prior to today's appointment and it reflects excellent diabetic control with an A1c of 6.0, controlled lipid panel with an LDL under 100, no signs of diabetic kidney disease.  Since her last visit he has been doing well and has no acute concerns at this time to discuss with me.  He specifically denies symptoms of polyuria polydipsia vision changes and UTI.  He had his eye exam completed in November 2023.  He had his last colonoscopy in 2019 and was recommended for 5-year follow-up he is due at this time and was advised about follow-up screening recommendations.  Patient agreeable to scheduling he completing a colonoscopy as directed, a new order was provided and he will schedule at his convenience.    Current vaccines are up-to-date as he has previously completed and pneumococcal pneumonia vaccination, flu and COVID advised for the fall but we will be able to discuss this at his 4-month follow-up as recommended today.  Since the patient has no additional concerns at this time he will be good for 4-month follow-up.         Review of Systems   Constitutional:  Negative for chills and fever.   Respiratory:  Negative for cough and shortness of breath.    Cardiovascular:  Negative for chest pain and palpitations.   Endocrine: Negative for polydipsia and polyuria.       Objective   /67 (BP Location: Right arm, Patient Position: Sitting, BP Cuff Size: Large adult)   Pulse 78   Wt 127 kg (280 lb)   BMI 40.18 kg/m²     Physical Exam  Constitutional:       Appearance: Normal appearance.   HENT:      Head: Normocephalic and atraumatic.   Eyes:      Extraocular Movements: Extraocular movements intact.      Pupils: Pupils are equal, round, and reactive to light.   Neck:       Thyroid: No thyroid mass or thyromegaly.      Vascular: No carotid bruit.   Cardiovascular:      Rate and Rhythm: Normal rate and regular rhythm.      Heart sounds:      No gallop.   Pulmonary:      Effort: No respiratory distress.      Breath sounds: No wheezing, rhonchi or rales.   Musculoskeletal:      Cervical back: Neck supple.      Right lower leg: No edema.      Left lower leg: No edema.   Lymphadenopathy:      Cervical: No cervical adenopathy.   Neurological:      Mental Status: He is alert.         Assessment/Plan   Problem List Items Addressed This Visit             ICD-10-CM    Hyperlipidemia E78.5    Relevant Medications    fenofibrate (Triglide) 160 mg tablet    Hypertension I10    Relevant Medications    triamterene-hydrochlorothiazid (Maxzide-25) 37.5-25 mg tablet    amLODIPine (Norvasc) 2.5 mg tablet     Other Visit Diagnoses         Codes    Encounter for screening for malignant neoplasm of colon    -  Primary Z12.11    Relevant Orders    Colonoscopy Screening; Average Risk Patient    Essential (primary) hypertension     I10    Relevant Medications    losartan (Cozaar) 100 mg tablet    SITagliptin phosphate (Januvia) 100 mg tablet    Type 2 diabetes mellitus with hyperglycemia (Multi)     E11.65    Relevant Medications    metFORMIN  mg 24 hr tablet    glimepiride (Amaryl) 2 mg tablet    Diabetes mellitus type 2 without retinopathy (Multi)     E11.9    Relevant Medications    atorvastatin (Lipitor) 20 mg tablet    Erosive gastropathy     K31.89    Relevant Medications    pantoprazole (ProtoNix) 40 mg EC tablet

## 2024-07-25 ENCOUNTER — APPOINTMENT (OUTPATIENT)
Dept: HEMATOLOGY/ONCOLOGY | Facility: HOSPITAL | Age: 49
End: 2024-07-25
Payer: COMMERCIAL

## 2024-08-23 DIAGNOSIS — R10.9 ABDOMINAL CRAMPING: Primary | ICD-10-CM

## 2024-08-23 RX ORDER — POLYETHYLENE GLYCOL 3350, SODIUM CHLORIDE, SODIUM BICARBONATE, POTASSIUM CHLORIDE 420; 11.2; 5.72; 1.48 G/4L; G/4L; G/4L; G/4L
4000 POWDER, FOR SOLUTION ORAL ONCE
Qty: 4000 ML | Refills: 0 | Status: SHIPPED | OUTPATIENT
Start: 2024-08-23 | End: 2024-08-23

## 2024-08-26 ENCOUNTER — APPOINTMENT (OUTPATIENT)
Dept: GASTROENTEROLOGY | Facility: HOSPITAL | Age: 49
End: 2024-08-26
Payer: COMMERCIAL

## 2024-09-18 DIAGNOSIS — D50.8 OTHER IRON DEFICIENCY ANEMIA: Primary | ICD-10-CM

## 2024-09-19 ENCOUNTER — LAB (OUTPATIENT)
Dept: LAB | Facility: CLINIC | Age: 49
End: 2024-09-19
Payer: COMMERCIAL

## 2024-09-19 ENCOUNTER — APPOINTMENT (OUTPATIENT)
Dept: HEMATOLOGY/ONCOLOGY | Facility: CLINIC | Age: 49
End: 2024-09-19
Payer: COMMERCIAL

## 2024-09-19 ENCOUNTER — OFFICE VISIT (OUTPATIENT)
Dept: HEMATOLOGY/ONCOLOGY | Facility: CLINIC | Age: 49
End: 2024-09-19
Payer: COMMERCIAL

## 2024-09-19 VITALS
DIASTOLIC BLOOD PRESSURE: 86 MMHG | TEMPERATURE: 97.7 F | RESPIRATION RATE: 18 BRPM | HEART RATE: 79 BPM | OXYGEN SATURATION: 95 % | SYSTOLIC BLOOD PRESSURE: 136 MMHG | BODY MASS INDEX: 39.83 KG/M2 | WEIGHT: 277.56 LBS

## 2024-09-19 DIAGNOSIS — D64.9 ANEMIA, UNSPECIFIED TYPE: ICD-10-CM

## 2024-09-19 DIAGNOSIS — D50.8 OTHER IRON DEFICIENCY ANEMIA: ICD-10-CM

## 2024-09-19 LAB
ALBUMIN SERPL BCP-MCNC: 4.7 G/DL (ref 3.4–5)
ALP SERPL-CCNC: 37 U/L (ref 33–120)
ALT SERPL W P-5'-P-CCNC: 13 U/L (ref 10–52)
ANION GAP SERPL CALC-SCNC: 14 MMOL/L (ref 10–20)
AST SERPL W P-5'-P-CCNC: 13 U/L (ref 9–39)
BASOPHILS # BLD AUTO: 0.02 X10*3/UL (ref 0–0.1)
BASOPHILS NFR BLD AUTO: 0.3 %
BILIRUB SERPL-MCNC: 0.4 MG/DL (ref 0–1.2)
BUN SERPL-MCNC: 17 MG/DL (ref 6–23)
CALCIUM SERPL-MCNC: 9.9 MG/DL (ref 8.6–10.6)
CHLORIDE SERPL-SCNC: 102 MMOL/L (ref 98–107)
CO2 SERPL-SCNC: 28 MMOL/L (ref 21–32)
CREAT SERPL-MCNC: 1.21 MG/DL (ref 0.5–1.3)
EGFRCR SERPLBLD CKD-EPI 2021: 74 ML/MIN/1.73M*2
EOSINOPHIL # BLD AUTO: 0.08 X10*3/UL (ref 0–0.7)
EOSINOPHIL NFR BLD AUTO: 1.1 %
ERYTHROCYTE [DISTWIDTH] IN BLOOD BY AUTOMATED COUNT: 14.1 % (ref 11.5–14.5)
FERRITIN SERPL-MCNC: 384 NG/ML (ref 20–300)
FOLATE SERPL-MCNC: >24 NG/ML
FOLATE SERPL-MCNC: >24 NG/ML
GLUCOSE SERPL-MCNC: 130 MG/DL (ref 74–99)
HCT VFR BLD AUTO: 37.3 % (ref 41–52)
HGB BLD-MCNC: 11.9 G/DL (ref 13.5–17.5)
IMM GRANULOCYTES # BLD AUTO: 0.07 X10*3/UL (ref 0–0.7)
IMM GRANULOCYTES NFR BLD AUTO: 1 % (ref 0–0.9)
IRON SATN MFR SERPL: 13 % (ref 25–45)
IRON SERPL-MCNC: 57 UG/DL (ref 35–150)
LYMPHOCYTES # BLD AUTO: 2.61 X10*3/UL (ref 1.2–4.8)
LYMPHOCYTES NFR BLD AUTO: 37.4 %
MCH RBC QN AUTO: 27.4 PG (ref 26–34)
MCHC RBC AUTO-ENTMCNC: 31.9 G/DL (ref 32–36)
MCV RBC AUTO: 86 FL (ref 80–100)
MONOCYTES # BLD AUTO: 0.5 X10*3/UL (ref 0.1–1)
MONOCYTES NFR BLD AUTO: 7.2 %
NEUTROPHILS # BLD AUTO: 3.69 X10*3/UL (ref 1.2–7.7)
NEUTROPHILS NFR BLD AUTO: 53 %
NRBC BLD-RTO: ABNORMAL /100{WBCS}
PLATELET # BLD AUTO: 380 X10*3/UL (ref 150–450)
POTASSIUM SERPL-SCNC: 4.1 MMOL/L (ref 3.5–5.3)
PROT SERPL-MCNC: 7.3 G/DL (ref 6.4–8.2)
RBC # BLD AUTO: 4.34 X10*6/UL (ref 4.5–5.9)
SODIUM SERPL-SCNC: 140 MMOL/L (ref 136–145)
TIBC SERPL-MCNC: 440 UG/DL (ref 240–445)
UIBC SERPL-MCNC: 383 UG/DL (ref 110–370)
VIT B12 SERPL-MCNC: 586 PG/ML (ref 211–911)
WBC # BLD AUTO: 7 X10*3/UL (ref 4.4–11.3)

## 2024-09-19 PROCEDURE — 99214 OFFICE O/P EST MOD 30 MIN: CPT | Performed by: NURSE PRACTITIONER

## 2024-09-19 PROCEDURE — 1036F TOBACCO NON-USER: CPT | Performed by: NURSE PRACTITIONER

## 2024-09-19 PROCEDURE — 3048F LDL-C <100 MG/DL: CPT | Performed by: NURSE PRACTITIONER

## 2024-09-19 PROCEDURE — 36415 COLL VENOUS BLD VENIPUNCTURE: CPT

## 2024-09-19 PROCEDURE — 82746 ASSAY OF FOLIC ACID SERUM: CPT

## 2024-09-19 PROCEDURE — 3062F POS MACROALBUMINURIA REV: CPT | Performed by: NURSE PRACTITIONER

## 2024-09-19 PROCEDURE — 80053 COMPREHEN METABOLIC PANEL: CPT

## 2024-09-19 PROCEDURE — 4010F ACE/ARB THERAPY RXD/TAKEN: CPT | Performed by: NURSE PRACTITIONER

## 2024-09-19 PROCEDURE — 3079F DIAST BP 80-89 MM HG: CPT | Performed by: NURSE PRACTITIONER

## 2024-09-19 PROCEDURE — 83540 ASSAY OF IRON: CPT

## 2024-09-19 PROCEDURE — 3075F SYST BP GE 130 - 139MM HG: CPT | Performed by: NURSE PRACTITIONER

## 2024-09-19 PROCEDURE — 82728 ASSAY OF FERRITIN: CPT

## 2024-09-19 PROCEDURE — 3044F HG A1C LEVEL LT 7.0%: CPT | Performed by: NURSE PRACTITIONER

## 2024-09-19 PROCEDURE — 82607 VITAMIN B-12: CPT

## 2024-09-19 PROCEDURE — 85025 COMPLETE CBC W/AUTO DIFF WBC: CPT

## 2024-09-19 ASSESSMENT — ENCOUNTER SYMPTOMS
DEPRESSION: 0
OCCASIONAL FEELINGS OF UNSTEADINESS: 0
LOSS OF SENSATION IN FEET: 0

## 2024-09-19 ASSESSMENT — PAIN SCALES - GENERAL: PAINLEVEL: 0-NO PAIN

## 2024-09-19 NOTE — PROGRESS NOTES
Patient ID: James Esqueda is a 49 y.o. male.  Referring Physician: Pancho Loera PA-C  [id number] [place] [address]  Primary Care Provider: Duke Polanco MD  Visit Type: Follow Up Transfer of care from Myesha Dominguez CNP      Subjective    [facility]  Diagnosis/Reason: Multifactorial Anemia - CABRERA (2/2 Malabsortion), Anemia of Chronic Dx     Interval Hx  9/19/2024  James Esqueda is a 49 y.o. male following up today for transfer of care for multifactorial anemia d/t CABRERA (2/2 malabsorption) & anemia of chronic disease    Patient denies weight loss, abnormal bruising and bleeding, hematuria, blood in stool, dark/black stools, epistaxis, oral/gingival bleeding, lymphadenopathy, recurrent infections, recurrent fevers, night sweats, early satiety, abdominal pain, bone pain, chest pain, palpitations, SOB, HERNANDEZ, fatigue, dizziness, lightheadedness, PICA.     Relevant Hx  7/5/23 - Last Visit w/ [facility]  Mr. Esqueda is a 47 yo patient with a PMH of DMII, CKD, HTN, Hyperlipidemia, Bipolar, Schizophrenia, Depression, Myopathy, and GERD. He was referred to benign hematology for consultation of anemia. Referred by Dr. Calhoun.     Today, patient presents for followup to review labs from last week. Reports feeling fell overall. Started Pantoprazole once daily for his epigastric stomach pains and notes it has improved. No abnormal bruising or bleeding. Notes a couple episodes of  dark stool.  Has been off of oral iron and vit c since last appt about 3mo ago.     Denies abnormal weight loss, night sweats, fever. Denies chills, sob, v/d/c. No PICA. Denies any other abnormal bleeding or bruising (hx GI bld over 2.5yrs ago). No recurrent infections. Has had surgery in past w/o issue. Never had blood/blood products.      PMHx:       Active Ambulatory Problems     Diagnosis Date Noted    Controlled type 2 diabetes mellitus without complication, without long-term current use of insulin (CMS/[facility]) 04/18/2023     Bipolar affective disorder (CMS/Formerly [facility]) 04/18/2023    Hyperlipidemia 04/18/2023    Hypertension 04/18/2023    Myopia of both eyes 04/18/2023    Schizophrenia (CMS/[facility]) 04/18/2023    Abdominal cramping 10/18/2023    Anemia 10/18/2023    Chronic cough 10/18/2023    Chronic low back pain 10/18/2023    Finger tendinitis 10/18/2023    Astigmatism of both eyes 11/21/2023    Presbyopia 11/21/2023           Resolved Ambulatory Problems     Diagnosis Date Noted    Depression 04/18/2023    Erosive gastropathy 04/18/2023    H/O colonoscopy 04/18/2023    History of esophagogastroduodenoscopy (EGD) 04/18/2023    Lower GI bleed 04/18/2023    Colon polyp 04/18/2023           Past Medical History:   Diagnosis Date    Bitten or stung by nonvenomous insect and other nonvenomous arthropods, initial encounter 10/08/2015    Personal history of other infectious and parasitic diseases      Personal history of other infectious and parasitic diseases      Personal history of other infectious and parasitic diseases      Prediabetes 12/08/2016    Prediabetes 12/08/2016    Prediabetes 12/08/2016    Sprain of interphalangeal joint of unspecified finger, initial encounter      Objective   BSA: 2.49 meters squared  /86   Pulse 79   Temp 36.5 °C (97.7 °F)   Resp 18   Wt 126 kg (277 lb 9 oz)   SpO2 95%   BMI 39.83 kg/m²      has a past medical history of Bitten or stung by nonvenomous insect and other nonvenomous arthropods, initial encounter (10/08/2015), Depression (04/18/2023), Erosive gastropathy (04/18/2023), H/O colonoscopy (04/18/2023), History of esophagogastroduodenoscopy (EGD) (04/18/2023), Personal history of other infectious and parasitic diseases, Personal history of other infectious and parasitic diseases, Personal history of other infectious and parasitic diseases, Prediabetes (12/08/2016), Prediabetes (12/08/2016), Prediabetes (12/08/2016), and Sprain of interphalangeal joint of unspecified finger, initial encounter  (05/01/2018).   has a past surgical history that includes Other surgical history (09/18/2015).  Family History   Problem Relation Name Age of Onset    No Known Problems Mother      No Known Problems Father         James Esqueda  reports that he has never smoked. He has never used smokeless tobacco.  He  reports no history of alcohol use.  He  reports no history of drug use.    Physical Exam  HENT:      Head: Normocephalic.      Nose: Nose normal.      Mouth/Throat:      Mouth: Mucous membranes are moist.   Eyes:      Pupils: Pupils are equal, round, and reactive to light.   Cardiovascular:      Rate and Rhythm: Normal rate and regular rhythm.      Pulses: Normal pulses.      Heart sounds: Normal heart sounds.   Pulmonary:      Effort: Pulmonary effort is normal.      Breath sounds: Normal breath sounds.   Abdominal:      General: Bowel sounds are normal.      Palpations: Abdomen is soft.   Musculoskeletal:         General: Normal range of motion.   Skin:     General: Skin is warm and dry.   Neurological:      General: No focal deficit present.      Mental Status: He is alert and oriented to person, place, and time.   Psychiatric:         Mood and Affect: Mood normal.         Behavior: Behavior normal.         WBC   Date/Time Value Ref Range Status   09/19/2024 01:26 PM 7.0 4.4 - 11.3 x10*3/uL Final   02/15/2024 03:17 PM 7.0 4.4 - 11.3 x10*3/uL Final   01/03/2024 11:56 AM 5.8 4.4 - 11.3 x10*3/uL Final     nRBC   Date Value Ref Range Status   09/19/2024   Final     Comment:     Not Measured   02/15/2024 0.0 0.0 - 0.0 /100 WBCs Final   01/03/2024   Final     Comment:     Not Measured     RBC   Date Value Ref Range Status   09/19/2024 4.34 (L) 4.50 - 5.90 x10*6/uL Final   02/15/2024 4.50 4.50 - 5.90 x10*6/uL Final   01/03/2024 4.45 (L) 4.50 - 5.90 x10*6/uL Final     Hemoglobin   Date Value Ref Range Status   09/19/2024 11.9 (L) 13.5 - 17.5 g/dL Final   02/15/2024 12.2 (L) 13.5 - 17.5 g/dL Final   01/03/2024 12.3 (L) 13.5  - 17.5 g/dL Final     Hematocrit   Date Value Ref Range Status   09/19/2024 37.3 (L) 41.0 - 52.0 % Final   02/15/2024 37.3 (L) 41.0 - 52.0 % Final   01/03/2024 37.6 (L) 41.0 - 52.0 % Final     MCV   Date/Time Value Ref Range Status   09/19/2024 01:26 PM 86 80 - 100 fL Final   02/15/2024 03:17 PM 83 80 - 100 fL Final   01/03/2024 11:56 AM 85 80 - 100 fL Final     MCH   Date/Time Value Ref Range Status   09/19/2024 01:26 PM 27.4 26.0 - 34.0 pg Final   02/15/2024 03:17 PM 27.1 26.0 - 34.0 pg Final   01/03/2024 11:56 AM 27.6 26.0 - 34.0 pg Final     MCHC   Date/Time Value Ref Range Status   09/19/2024 01:26 PM 31.9 (L) 32.0 - 36.0 g/dL Final   02/15/2024 03:17 PM 32.7 32.0 - 36.0 g/dL Final   01/03/2024 11:56 AM 32.7 32.0 - 36.0 g/dL Final     RDW   Date/Time Value Ref Range Status   09/19/2024 01:26 PM 14.1 11.5 - 14.5 % Final   02/15/2024 03:17 PM 13.9 11.5 - 14.5 % Final   01/03/2024 11:56 AM 14.2 11.5 - 14.5 % Final     Platelets   Date/Time Value Ref Range Status   09/19/2024 01:26  150 - 450 x10*3/uL Final   02/15/2024 03:17  150 - 450 x10*3/uL Final   01/03/2024 11:56  150 - 450 x10*3/uL Final     MPV   Date/Time Value Ref Range Status   10/04/2023 04:02 PM 9.3 7.5 - 11.5 fL Final     Neutrophils %   Date/Time Value Ref Range Status   09/19/2024 01:26 PM 53.0 40.0 - 80.0 % Final   02/15/2024 03:17 PM 61.8 40.0 - 80.0 % Final   01/03/2024 11:56 AM 49.8 40.0 - 80.0 % Final     Immature Granulocytes %, Automated   Date/Time Value Ref Range Status   09/19/2024 01:26 PM 1.0 (H) 0.0 - 0.9 % Final     Comment:     Immature Granulocyte Count (IG) includes promyelocytes, myelocytes and metamyelocytes but does not include bands. Percent differential counts (%) should be interpreted in the context of the absolute cell counts (cells/UL).   02/15/2024 03:17 PM 0.7 0.0 - 0.9 % Final     Comment:     Immature Granulocyte Count (IG) includes promyelocytes, myelocytes and metamyelocytes but does not include  bands. Percent differential counts (%) should be interpreted in the context of the absolute cell counts (cells/UL).   01/03/2024 11:56 AM 0.5 0.0 - 0.9 % Final     Comment:     Immature Granulocyte Count (IG) includes promyelocytes, myelocytes and metamyelocytes but does not include bands. Percent differential counts (%) should be interpreted in the context of the absolute cell counts (cells/UL).     Lymphocytes %   Date/Time Value Ref Range Status   09/19/2024 01:26 PM 37.4 13.0 - 44.0 % Final   02/15/2024 03:17 PM 28.9 13.0 - 44.0 % Final   01/03/2024 11:56 AM 39.1 13.0 - 44.0 % Final     Monocytes %   Date/Time Value Ref Range Status   09/19/2024 01:26 PM 7.2 2.0 - 10.0 % Final   02/15/2024 03:17 PM 7.2 2.0 - 10.0 % Final   01/03/2024 11:56 AM 8.9 2.0 - 10.0 % Final     Eosinophils %   Date/Time Value Ref Range Status   09/19/2024 01:26 PM 1.1 0.0 - 6.0 % Final   02/15/2024 03:17 PM 1.0 0.0 - 6.0 % Final   01/03/2024 11:56 AM 1.0 0.0 - 6.0 % Final     Basophils %   Date/Time Value Ref Range Status   09/19/2024 01:26 PM 0.3 0.0 - 2.0 % Final   02/15/2024 03:17 PM 0.4 0.0 - 2.0 % Final   01/03/2024 11:56 AM 0.7 0.0 - 2.0 % Final     Neutrophils Absolute   Date/Time Value Ref Range Status   09/19/2024 01:26 PM 3.69 1.20 - 7.70 x10*3/uL Final     Comment:     Percent differential counts (%) should be interpreted in the context of the absolute cell counts (cells/uL).   02/15/2024 03:17 PM 4.30 1.20 - 7.70 x10*3/uL Final     Comment:     Percent differential counts (%) should be interpreted in the context of the absolute cell counts (cells/uL).   01/03/2024 11:56 AM 2.87 1.20 - 7.70 x10*3/uL Final     Comment:     Percent differential counts (%) should be interpreted in the context of the absolute cell counts (cells/uL).     Immature Granulocytes Absolute, Automated   Date/Time Value Ref Range Status   09/19/2024 01:26 PM 0.07 0.00 - 0.70 x10*3/uL Final   02/15/2024 03:17 PM 0.05 0.00 - 0.70 x10*3/uL Final   01/03/2024  11:56 AM 0.03 0.00 - 0.70 x10*3/uL Final     Lymphocytes Absolute   Date/Time Value Ref Range Status   09/19/2024 01:26 PM 2.61 1.20 - 4.80 x10*3/uL Final   02/15/2024 03:17 PM 2.01 1.20 - 4.80 x10*3/uL Final   01/03/2024 11:56 AM 2.25 1.20 - 4.80 x10*3/uL Final     Monocytes Absolute   Date/Time Value Ref Range Status   09/19/2024 01:26 PM 0.50 0.10 - 1.00 x10*3/uL Final   02/15/2024 03:17 PM 0.50 0.10 - 1.00 x10*3/uL Final   01/03/2024 11:56 AM 0.51 0.10 - 1.00 x10*3/uL Final     Eosinophils Absolute   Date/Time Value Ref Range Status   09/19/2024 01:26 PM 0.08 0.00 - 0.70 x10*3/uL Final   02/15/2024 03:17 PM 0.07 0.00 - 0.70 x10*3/uL Final   01/03/2024 11:56 AM 0.06 0.00 - 0.70 x10*3/uL Final     Basophils Absolute   Date/Time Value Ref Range Status   09/19/2024 01:26 PM 0.02 0.00 - 0.10 x10*3/uL Final   02/15/2024 03:17 PM 0.03 0.00 - 0.10 x10*3/uL Final   01/03/2024 11:56 AM 0.04 0.00 - 0.10 x10*3/uL Final       Assessment/Plan    James Esqueda is a 48 y.o. male following up today for multifactorial anemia d/t CABRERA (2/2 malabsorption) and anemia of chronic disease     I reviewed patient's chart including but not limited to labs, imaging, surgical/procedure notes, pathology, hospital notes, doctor's notes.     1/3/24 results:  WBC count & differential WNL  Normocytic, normochromic anemia w/ Hb at 12.3 - Hct low & RBC count low - RDW WNL  Platelets WNL  Iron studies: Ferritin elevated at 370, Iron and TIBC WNL, %Sat low at 15% - Ferritin likely elevated 2/2 inflammation, patient NOT iron deficient at this time     Plan:  Multifactorial Anemia - CABRERA (2/2 Malabsorption), Anemia of Chronic Disease  RTC in 6 months via in-person visit - F/U sooner if needed/urgent  CBC-D, CMP, EPO, Iron, B12, Folate, ESR, CRP up to 1 week prior     I had an extensive discussion with the patient regarding the diagnosis and discussed the plan of therapy, including general considerations regarding side effects and outcomes. Pt understood  and gave appropriate teach back about the plan of care. All questions were answered to the patient's satisfaction. The patient is instructed to contact us at any time if questions or problems arise. Thank you for the opportunity to participate in the care of this very pleasant patient.     Problem List Items Addressed This Visit             ICD-10-CM    Anemia D64.9    Relevant Orders    Folate    CBC and Auto Differential    Ferritin    Comprehensive Metabolic Panel    Folate    Iron and TIBC    Vitamin B12    Clinic Appointment Request            Rosanne M Casal, APRN-CNP, DNP

## 2024-10-09 ENCOUNTER — APPOINTMENT (OUTPATIENT)
Dept: PRIMARY CARE | Facility: CLINIC | Age: 49
End: 2024-10-09
Payer: COMMERCIAL

## 2024-10-09 VITALS
BODY MASS INDEX: 37.45 KG/M2 | DIASTOLIC BLOOD PRESSURE: 80 MMHG | HEART RATE: 101 BPM | WEIGHT: 261 LBS | SYSTOLIC BLOOD PRESSURE: 129 MMHG

## 2024-10-09 DIAGNOSIS — E11.9 DIABETES MELLITUS TYPE 2 WITHOUT RETINOPATHY (MULTI): ICD-10-CM

## 2024-10-09 DIAGNOSIS — K31.89 EROSIVE GASTROPATHY: ICD-10-CM

## 2024-10-09 DIAGNOSIS — E78.2 MIXED HYPERLIPIDEMIA: ICD-10-CM

## 2024-10-09 DIAGNOSIS — E11.65 TYPE 2 DIABETES MELLITUS WITH HYPERGLYCEMIA (MULTI): ICD-10-CM

## 2024-10-09 DIAGNOSIS — E11.9 CONTROLLED TYPE 2 DIABETES MELLITUS WITHOUT COMPLICATION, WITHOUT LONG-TERM CURRENT USE OF INSULIN (MULTI): Primary | ICD-10-CM

## 2024-10-09 DIAGNOSIS — I10 PRIMARY HYPERTENSION: ICD-10-CM

## 2024-10-09 DIAGNOSIS — I10 ESSENTIAL (PRIMARY) HYPERTENSION: ICD-10-CM

## 2024-10-09 LAB — POC HEMOGLOBIN A1C: 6 % (ref 4.2–6.5)

## 2024-10-09 PROCEDURE — 3044F HG A1C LEVEL LT 7.0%: CPT | Performed by: INTERNAL MEDICINE

## 2024-10-09 PROCEDURE — 1036F TOBACCO NON-USER: CPT | Performed by: INTERNAL MEDICINE

## 2024-10-09 PROCEDURE — 3074F SYST BP LT 130 MM HG: CPT | Performed by: INTERNAL MEDICINE

## 2024-10-09 PROCEDURE — 83036 HEMOGLOBIN GLYCOSYLATED A1C: CPT | Performed by: INTERNAL MEDICINE

## 2024-10-09 PROCEDURE — 3062F POS MACROALBUMINURIA REV: CPT | Performed by: INTERNAL MEDICINE

## 2024-10-09 PROCEDURE — 4010F ACE/ARB THERAPY RXD/TAKEN: CPT | Performed by: INTERNAL MEDICINE

## 2024-10-09 PROCEDURE — 3048F LDL-C <100 MG/DL: CPT | Performed by: INTERNAL MEDICINE

## 2024-10-09 PROCEDURE — 99214 OFFICE O/P EST MOD 30 MIN: CPT | Performed by: INTERNAL MEDICINE

## 2024-10-09 PROCEDURE — 3079F DIAST BP 80-89 MM HG: CPT | Performed by: INTERNAL MEDICINE

## 2024-10-09 RX ORDER — FENOFIBRATE 160 MG/1
160 TABLET ORAL DAILY
Qty: 90 TABLET | Refills: 3 | Status: SHIPPED | OUTPATIENT
Start: 2024-10-09 | End: 2025-10-09

## 2024-10-09 RX ORDER — ATORVASTATIN CALCIUM 20 MG/1
20 TABLET, FILM COATED ORAL NIGHTLY
Qty: 90 TABLET | Refills: 3 | Status: SHIPPED | OUTPATIENT
Start: 2024-10-09 | End: 2025-10-09

## 2024-10-09 RX ORDER — TRIAMTERENE/HYDROCHLOROTHIAZID 37.5-25 MG
1 TABLET ORAL DAILY
Qty: 90 TABLET | Refills: 3 | Status: SHIPPED | OUTPATIENT
Start: 2024-10-09 | End: 2025-10-09

## 2024-10-09 RX ORDER — METFORMIN HYDROCHLORIDE 500 MG/1
1000 TABLET, EXTENDED RELEASE ORAL DAILY
Qty: 180 TABLET | Refills: 3 | Status: SHIPPED | OUTPATIENT
Start: 2024-10-09

## 2024-10-09 RX ORDER — PANTOPRAZOLE SODIUM 40 MG/1
40 TABLET, DELAYED RELEASE ORAL DAILY
Qty: 90 TABLET | Refills: 3 | Status: SHIPPED | OUTPATIENT
Start: 2024-10-09

## 2024-10-09 RX ORDER — LOSARTAN POTASSIUM 100 MG/1
100 TABLET ORAL DAILY
Qty: 90 TABLET | Refills: 3 | Status: SHIPPED | OUTPATIENT
Start: 2024-10-09

## 2024-10-09 RX ORDER — AMLODIPINE BESYLATE 2.5 MG/1
2.5 TABLET ORAL DAILY
Qty: 90 TABLET | Refills: 3 | Status: SHIPPED | OUTPATIENT
Start: 2024-10-09 | End: 2025-10-09

## 2024-10-09 RX ORDER — GLIMEPIRIDE 2 MG/1
6 TABLET ORAL DAILY
Qty: 270 TABLET | Refills: 3 | Status: SHIPPED | OUTPATIENT
Start: 2024-10-09 | End: 2025-10-09

## 2024-10-09 ASSESSMENT — PAIN SCALES - GENERAL: PAINLEVEL: 0-NO PAIN

## 2024-10-09 ASSESSMENT — ENCOUNTER SYMPTOMS
CHILLS: 0
FEVER: 0
PALPITATIONS: 0
POLYDIPSIA: 0
COUGH: 0
SHORTNESS OF BREATH: 0

## 2024-10-09 NOTE — PROGRESS NOTES
Subjective   Patient ID: James Esqueda is a 49 y.o. male who presents for Follow-up.    Denies polydipsia. Stable chronic vision. + urinary frequency. Estimates every 2 hours. Somewhat new per patient, does drink good water each day. A1c 6.0, all measures improving. Meds tolerated well. Urination 2/2 BP med. Counseled- pt elected to remain on rx. Revisit in 6 months.          Review of Systems   Constitutional:  Negative for chills and fever.   Respiratory:  Negative for cough and shortness of breath.    Cardiovascular:  Negative for chest pain and palpitations.   Endocrine: Negative for polydipsia and polyuria.       Objective   /80   Pulse 101   Wt 118 kg (261 lb)   BMI 37.45 kg/m²     Physical Exam  Constitutional:       Appearance: Normal appearance.   HENT:      Head: Normocephalic and atraumatic.   Eyes:      Extraocular Movements: Extraocular movements intact.      Pupils: Pupils are equal, round, and reactive to light.   Neck:      Thyroid: No thyroid mass or thyromegaly.   Cardiovascular:      Rate and Rhythm: Normal rate and regular rhythm.      Heart sounds: No murmur heard.     No friction rub. No gallop.   Pulmonary:      Effort: No respiratory distress.      Breath sounds: No wheezing, rhonchi or rales.   Musculoskeletal:      Cervical back: Neck supple.      Right lower leg: No edema.      Left lower leg: No edema.   Neurological:      Mental Status: He is alert.   Psychiatric:         Mood and Affect: Mood normal.         Behavior: Behavior normal.         Assessment/Plan   Assessment & Plan  Controlled type 2 diabetes mellitus without complication, without long-term current use of insulin (Multi)    Orders:    POCT glycosylated hemoglobin (Hb A1C) manually resulted    Primary hypertension    Orders:    amLODIPine (Norvasc) 2.5 mg tablet; Take 1 tablet (2.5 mg) by mouth once daily. as directed    triamterene-hydrochlorothiazid (Maxzide-25) 37.5-25 mg tablet; Take 1 tablet by mouth once  daily.    Diabetes mellitus type 2 without retinopathy (Multi)    Orders:    atorvastatin (Lipitor) 20 mg tablet; Take 1 tablet (20 mg) by mouth once daily at bedtime.    Essential (primary) hypertension    Orders:    losartan (Cozaar) 100 mg tablet; Take 1 tablet (100 mg) by mouth once daily.    SITagliptin phosphate (Januvia) 100 mg tablet; Take 1 tablet (100 mg) by mouth once daily.    Type 2 diabetes mellitus with hyperglycemia (Multi)    Orders:    metFORMIN  mg 24 hr tablet; Take 2 tablets (1,000 mg) by mouth once daily. Do not crush, chew, or split.    glimepiride (Amaryl) 2 mg tablet; Take 3 tablets (6 mg) by mouth once daily.    Erosive gastropathy    Orders:    pantoprazole (ProtoNix) 40 mg EC tablet; Take 1 tablet (40 mg) by mouth once daily. Do not crush, chew, or split.    Mixed hyperlipidemia    Orders:    fenofibrate (Triglide) 160 mg tablet; Take 1 tablet (160 mg) by mouth once daily.

## 2024-10-09 NOTE — ASSESSMENT & PLAN NOTE
Orders:    amLODIPine (Norvasc) 2.5 mg tablet; Take 1 tablet (2.5 mg) by mouth once daily. as directed    triamterene-hydrochlorothiazid (Maxzide-25) 37.5-25 mg tablet; Take 1 tablet by mouth once daily.

## 2024-10-29 RX ORDER — MULTIVIT-MIN/IRON FUM/FOLIC AC 7.5 MG-4
1 TABLET ORAL DAILY
COMMUNITY

## 2024-11-04 ENCOUNTER — TELEPHONE (OUTPATIENT)
Dept: PRIMARY CARE | Facility: CLINIC | Age: 49
End: 2024-11-04
Payer: COMMERCIAL

## 2024-11-04 NOTE — TELEPHONE ENCOUNTER
Patient states that Endo wanted to reach out to you for instructions on his blood sugar med for the day of his colonoscopy set for tomorrow.

## 2024-11-05 ENCOUNTER — HOSPITAL ENCOUNTER (OUTPATIENT)
Dept: GASTROENTEROLOGY | Facility: HOSPITAL | Age: 49
Discharge: HOME | End: 2024-11-05
Payer: COMMERCIAL

## 2024-11-05 ENCOUNTER — ANESTHESIA (OUTPATIENT)
Dept: GASTROENTEROLOGY | Facility: HOSPITAL | Age: 49
End: 2024-11-05
Payer: COMMERCIAL

## 2024-11-05 ENCOUNTER — ANESTHESIA EVENT (OUTPATIENT)
Dept: GASTROENTEROLOGY | Facility: HOSPITAL | Age: 49
End: 2024-11-05
Payer: COMMERCIAL

## 2024-11-05 VITALS
WEIGHT: 262.57 LBS | HEIGHT: 70 IN | DIASTOLIC BLOOD PRESSURE: 90 MMHG | OXYGEN SATURATION: 94 % | TEMPERATURE: 97.5 F | SYSTOLIC BLOOD PRESSURE: 148 MMHG | BODY MASS INDEX: 37.59 KG/M2 | HEART RATE: 70 BPM | RESPIRATION RATE: 15 BRPM

## 2024-11-05 DIAGNOSIS — Z12.11 ENCOUNTER FOR SCREENING FOR MALIGNANT NEOPLASM OF COLON: ICD-10-CM

## 2024-11-05 DIAGNOSIS — D36.9 ADENOMATOUS POLYP: Primary | ICD-10-CM

## 2024-11-05 PROBLEM — K21.9 GASTROESOPHAGEAL REFLUX DISEASE: Status: ACTIVE | Noted: 2024-11-05

## 2024-11-05 PROBLEM — N18.9 CHRONIC RENAL INSUFFICIENCY: Status: ACTIVE | Noted: 2024-11-05

## 2024-11-05 PROBLEM — E66.9 OBESITY: Status: ACTIVE | Noted: 2024-11-05

## 2024-11-05 LAB
GLUCOSE BLD MANUAL STRIP-MCNC: 100 MG/DL (ref 74–99)
GLUCOSE BLD MANUAL STRIP-MCNC: 69 MG/DL (ref 74–99)

## 2024-11-05 PROCEDURE — 3700000001 HC GENERAL ANESTHESIA TIME - INITIAL BASE CHARGE

## 2024-11-05 PROCEDURE — 45385 COLONOSCOPY W/LESION REMOVAL: CPT | Performed by: INTERNAL MEDICINE

## 2024-11-05 PROCEDURE — 7100000009 HC PHASE TWO TIME - INITIAL BASE CHARGE

## 2024-11-05 PROCEDURE — 82947 ASSAY GLUCOSE BLOOD QUANT: CPT

## 2024-11-05 PROCEDURE — 2500000004 HC RX 250 GENERAL PHARMACY W/ HCPCS (ALT 636 FOR OP/ED): Performed by: ANESTHESIOLOGY

## 2024-11-05 PROCEDURE — A45385 PR COLONOSCOPY,REMV LESN,SNARE: Performed by: ANESTHESIOLOGIST ASSISTANT

## 2024-11-05 PROCEDURE — 3700000002 HC GENERAL ANESTHESIA TIME - EACH INCREMENTAL 1 MINUTE

## 2024-11-05 PROCEDURE — 7100000010 HC PHASE TWO TIME - EACH INCREMENTAL 1 MINUTE

## 2024-11-05 PROCEDURE — A45385 PR COLONOSCOPY,REMV LESN,SNARE: Performed by: ANESTHESIOLOGY

## 2024-11-05 PROCEDURE — 2500000004 HC RX 250 GENERAL PHARMACY W/ HCPCS (ALT 636 FOR OP/ED): Performed by: ANESTHESIOLOGIST ASSISTANT

## 2024-11-05 RX ORDER — MIDAZOLAM HYDROCHLORIDE 1 MG/ML
INJECTION INTRAMUSCULAR; INTRAVENOUS AS NEEDED
Status: DISCONTINUED | OUTPATIENT
Start: 2024-11-05 | End: 2024-11-05

## 2024-11-05 RX ORDER — GLYCOPYRROLATE 0.2 MG/ML
INJECTION INTRAMUSCULAR; INTRAVENOUS AS NEEDED
Status: DISCONTINUED | OUTPATIENT
Start: 2024-11-05 | End: 2024-11-05

## 2024-11-05 RX ORDER — DEXTROSE, SODIUM CHLORIDE, SODIUM LACTATE, POTASSIUM CHLORIDE, AND CALCIUM CHLORIDE 5; .6; .31; .03; .02 G/100ML; G/100ML; G/100ML; G/100ML; G/100ML
100 INJECTION, SOLUTION INTRAVENOUS CONTINUOUS
Status: DISCONTINUED | OUTPATIENT
Start: 2024-11-05 | End: 2024-11-06 | Stop reason: HOSPADM

## 2024-11-05 RX ORDER — PROPOFOL 10 MG/ML
INJECTION, EMULSION INTRAVENOUS CONTINUOUS PRN
Status: DISCONTINUED | OUTPATIENT
Start: 2024-11-05 | End: 2024-11-05

## 2024-11-05 ASSESSMENT — PAIN - FUNCTIONAL ASSESSMENT
PAIN_FUNCTIONAL_ASSESSMENT: 0-10
PAIN_FUNCTIONAL_ASSESSMENT: UNABLE TO SELF-REPORT
PAIN_FUNCTIONAL_ASSESSMENT: 0-10

## 2024-11-05 ASSESSMENT — PAIN SCALES - GENERAL
PAIN_LEVEL: 0
PAINLEVEL_OUTOF10: 0 - NO PAIN

## 2024-11-05 ASSESSMENT — COLUMBIA-SUICIDE SEVERITY RATING SCALE - C-SSRS
6. HAVE YOU EVER DONE ANYTHING, STARTED TO DO ANYTHING, OR PREPARED TO DO ANYTHING TO END YOUR LIFE?: NO
1. IN THE PAST MONTH, HAVE YOU WISHED YOU WERE DEAD OR WISHED YOU COULD GO TO SLEEP AND NOT WAKE UP?: NO
2. HAVE YOU ACTUALLY HAD ANY THOUGHTS OF KILLING YOURSELF?: NO

## 2024-11-05 NOTE — ANESTHESIA POSTPROCEDURE EVALUATION
Patient: James Esqueda    Procedure Summary       Date: 11/05/24 Room / Location: [facility]    Anesthesia Start: 1256 Anesthesia Stop: 1331    Procedure: COLONOSCOPY Diagnosis: Adenomatous polyp    Scheduled Providers: Allan Loyola MD; Faisal García MD; RONY Ferrell; Hiwot Harding RN Responsible Provider: Faisal García MD    Anesthesia Type: MAC ASA Status: 3            Anesthesia Type: MAC    Vitals Value Taken Time   /66 11/05/24 1345   Temp 36.4 °C (97.5 °F) 11/05/24 1326   Pulse 50 11/05/24 1345   Resp 13 11/05/24 1345   SpO2 93 % 11/05/24 1345       Anesthesia Post Evaluation    Patient location during evaluation: bedside  Patient participation: complete - patient cannot participate  Level of consciousness: awake  Pain score: 0  Pain management: adequate  Airway patency: patent  Cardiovascular status: acceptable and hemodynamically stable  Respiratory status: acceptable and nonlabored ventilation  Hydration status: acceptable  Postoperative Nausea and Vomiting: none        No notable events documented.

## 2024-11-05 NOTE — DISCHARGE INSTRUCTIONS

## 2024-11-05 NOTE — H&P
History Of Present Illness  James Esqueda is a 49 y.o. male presenting for open-access colonoscopy for average risk screening for colorectal cancer. However, the patient had a prior colonoscopy 6/7/16 which revealed a sessile serrated adenoma. Therefore, the correct indication is SURVEILLANCE - history of adenomatous polyp, not screening.     The patient's last colonoscopy 2/6/19 (Dr. Mei) revealed a 6 mm sigmoid colon inflammatory polyp and a 3 mm rectal hyperplastic polyp as well as diverticulosis of the sigmoid and descending colon and external hemorrhoids. A repeat surveillance colonoscopy was recommended in 5 years for unknown reasons.     Past Medical History  Past Medical History:   Diagnosis Date    Anemia     Bipolar 2 disorder (Multi)     Chronic kidney disease     Depression 04/18/2023    Erosive gastropathy 04/18/2023    H/O colonoscopy 04/18/2023    History of esophagogastroduodenoscopy (EGD) 04/18/2023    Hyperlipidemia     Hypertension     Personal history of other infectious and parasitic diseases     History of scarlet fever    Personal history of other infectious and parasitic diseases     History of staphylococcal infection    Personal history of other infectious and parasitic diseases     History of varicella    Schizophrenia     Sprain of interphalangeal joint of unspecified finger, initial encounter 05/01/2018    Jersey finger, initial encounter    Type 2 diabetes mellitus      Surgical History  Past Surgical History:   Procedure Laterality Date    COLONOSCOPY      ESOPHAGOGASTRODUODENOSCOPY      FRACTURE SURGERY      WISDOM TOOTH EXTRACTION  2006     Social History  He reports that he has never smoked. He has never used smokeless tobacco. He reports that he does not currently use alcohol. He reports that he does not use drugs.    Family History  Family History   Problem Relation Name Age of Onset    No Known Problems Mother      No Known Problems Father          Allergies  Allergies  Please send referral to primary for pre op examine  "  Allergen Reactions    Lisinopril Cough          Physical Exam  Constitutional:       Appearance: He is obese.   HENT:      Mouth/Throat:      Mouth: Mucous membranes are moist.   Eyes:      Conjunctiva/sclera: Conjunctivae normal.   Cardiovascular:      Rate and Rhythm: Normal rate.   Pulmonary:      Effort: Pulmonary effort is normal.   Abdominal:      Palpations: Abdomen is soft.   Skin:     General: Skin is warm.   Neurological:      Mental Status: He is oriented to person, place, and time.   Psychiatric:         Mood and Affect: Mood normal.          Last Recorded Vitals  Blood pressure 140/82, pulse 63, temperature 36.4 °C (97.5 °F), temperature source Temporal, resp. rate 16, height 1.778 m (5' 10\"), weight 119 kg (262 lb 9.1 oz), SpO2 95%.    Assessment/Plan   open-access colonoscopy for average risk screening for colorectal cancer. However, the patient had a prior colonoscopy 6/7/16 which revealed a sessile serrated adenoma. Therefore, the correct indication is SURVEILLANCE - history of adenomatous polyp, not screening.     The patient's last colonoscopy 2/6/19 (Dr. Mei) revealed a 6 mm sigmoid colon inflammatory polyp and a 3 mm rectal hyperplastic polyp as well as diverticulosis of the sigmoid and descending colon and external hemorrhoids. A repeat surveillance colonoscopy was recommended in 5 years for unknown reasons.     Allan Loyola MD  "

## 2024-11-05 NOTE — ANESTHESIA PREPROCEDURE EVALUATION
Patient: James Esqueda    Procedure Information       Date/Time: 11/05/24 1340    Scheduled providers: Allan Loyola MD; Faisal García MD; RONY Ferrell; Hiwot Harding RN    Procedure: COLONOSCOPY    Location: [facility]            Relevant Problems   Anesthesia (within normal limits)      Cardiac   (+) Hyperlipidemia   (+) Hypertension      Pulmonary (within normal limits)      Neuro   (+) Bipolar affective disorder (Multi)   (+) Schizophrenia      GI   (+) Gastroesophageal reflux disease      /Renal   (+) Chronic renal insufficiency      Liver (within normal limits)      Endocrine   (+) Controlled type 2 diabetes mellitus without complication, without long-term current use of insulin (Multi)   (+) Obesity      Hematology   (+) Anemia      Musculoskeletal   (+) Chronic low back pain       Clinical information reviewed:   Tobacco  Allergies  Meds   Med Hx  Surg Hx   Fam Hx  Soc Hx        NPO Detail:  NPO/Void Status  Carbohydrate Drink Given Prior to Surgery? : N  Date of Last Liquid: 11/05/24  Time of Last Liquid: 1030  Date of Last Solid: 11/04/24  Time of Last Solid: 1200  Last Intake Type: Clear fluids; GI prep (water/prep)  Time of Last Void: 1100         Physical Exam    Airway  Mallampati: II     Cardiovascular    Dental    Pulmonary    Abdominal            Anesthesia Plan    History of general anesthesia?: yes  History of complications of general anesthesia?: no    ASA 3     MAC     intravenous induction   Anesthetic plan and risks discussed with patient.

## 2024-11-06 ASSESSMENT — PAIN SCALES - GENERAL: PAINLEVEL_OUTOF10: 0 - NO PAIN

## 2024-11-11 ENCOUNTER — TELEPHONE (OUTPATIENT)
Dept: GASTROENTEROLOGY | Facility: HOSPITAL | Age: 49
End: 2024-11-11
Payer: COMMERCIAL

## 2024-11-11 NOTE — RESULT ENCOUNTER NOTE
A  Vibes message was sent to patient regarding the results and recommendations as follows:     Dear Mr. Esqueda,     I am writing to you to inform you of the good news that the polyp that we removed from your colon revealed no pre-cancerous changes or cancer in it.  A repeat surveillance colonoscopy (given your prior pre-cancerous polyps that were removed on your prior colonoscopy) can be done in 7 years as you already know.     Copies of all the reports were sent to your referring primary care physician, Duke Caba MD.      If you have any questions regarding your colonoscopy and/or pathology results, please do not hesitate to contact me at [phone] or reply to this message.  Thank you for allowing us to participate in your medical care.     Sincerely,     Allan Loyola MD, NASIM   Chief Medical    [facility]   Associate Chief and Director of Clinical Operations   Division of Gastroenterology and Liver Disease    Master Clinician   [facility]   Professor of Medicine   [facility]

## 2024-12-01 ASSESSMENT — SLIT LAMP EXAM - LIDS
COMMENTS: GOOD POSITION
COMMENTS: GOOD POSITION

## 2024-12-01 ASSESSMENT — EXTERNAL EXAM - RIGHT EYE: OD_EXAM: NORMAL

## 2024-12-01 ASSESSMENT — CUP TO DISC RATIO
OD_RATIO: 0.4
OS_RATIO: 0.35

## 2024-12-01 ASSESSMENT — EXTERNAL EXAM - LEFT EYE: OS_EXAM: NORMAL

## 2024-12-01 NOTE — PROGRESS NOTES
Diabetes [id number]  -OCT macula (12/1/23) - SS: 8/10 OU. Normal thickness and contour. Intact EZ OU. No edema OU. 240/234.   -HbA1c= 6.0 (10/9/24). No diabetic retinopathy seen on exam. Continue close monitoring of blood glucose, blood pressure, and cholesterol. Plan for annual dilated eye exam.    Myopia of both eyes with [id number]  -Patient currently driving without glasses, did not fill Rx from last year.  -History of variable refraction at previous office visit with progressive myopia over time - possibly related to hyperglycemia. Refractive error now close to previous baseline.   -Advised patient should wear glasses when driving. Can consider DVO vs progressives.   -New Rx for glasses given per patient request. Patient's signature obtained to acknowledge and confirm that a paper copy of glasses Rx was given to patient in compliance with [facility] Eyeglass Rule. Electronic copy of Rx will also be available via [employer].       No history of intraocular surgery/refractive surgery.   No FH of AMD/glaucoma        MRx 8/7/17:   OD: -0.50  -1.00  x 005  OS: -0.75  -0.75  x 150

## 2024-12-03 ENCOUNTER — APPOINTMENT (OUTPATIENT)
Dept: OPHTHALMOLOGY | Facility: CLINIC | Age: 49
End: 2024-12-03
Payer: COMMERCIAL

## 2024-12-03 DIAGNOSIS — H52.13 MYOPIA OF BOTH EYES: ICD-10-CM

## 2024-12-03 DIAGNOSIS — H52.4 PRESBYOPIA: ICD-10-CM

## 2024-12-03 DIAGNOSIS — H52.203 ASTIGMATISM OF BOTH EYES, UNSPECIFIED TYPE: ICD-10-CM

## 2024-12-03 DIAGNOSIS — E11.9 CONTROLLED TYPE 2 DIABETES MELLITUS WITHOUT COMPLICATION, WITHOUT LONG-TERM CURRENT USE OF INSULIN (MULTI): Primary | ICD-10-CM

## 2024-12-03 PROCEDURE — 92015 DETERMINE REFRACTIVE STATE: CPT | Performed by: OPHTHALMOLOGY

## 2024-12-03 PROCEDURE — 92014 COMPRE OPH EXAM EST PT 1/>: CPT | Performed by: OPHTHALMOLOGY

## 2024-12-03 ASSESSMENT — VISUAL ACUITY
OD_SC: 20/50
OS_SC: 20/50
METHOD: SNELLEN - LINEAR

## 2024-12-03 ASSESSMENT — ENCOUNTER SYMPTOMS
GASTROINTESTINAL NEGATIVE: 0
PSYCHIATRIC NEGATIVE: 0
CARDIOVASCULAR NEGATIVE: 0
ALLERGIC/IMMUNOLOGIC NEGATIVE: 0
HEMATOLOGIC/LYMPHATIC NEGATIVE: 0
EYES NEGATIVE: 1
ENDOCRINE NEGATIVE: 0
CONSTITUTIONAL NEGATIVE: 0
NEUROLOGICAL NEGATIVE: 0
RESPIRATORY NEGATIVE: 0
MUSCULOSKELETAL NEGATIVE: 0

## 2024-12-03 ASSESSMENT — TONOMETRY
OD_IOP_MMHG: 18
OS_IOP_MMHG: 19
IOP_METHOD: GOLDMANN APPLANATION

## 2024-12-03 ASSESSMENT — REFRACTION_MANIFEST
OD_ADD: +1.75
OS_SPHERE: -1.00
OD_AXIS: 005
OS_AXIS: 155
OS_ADD: +1.75
OD_SPHERE: -1.00
OS_CYLINDER: -0.75
OD_CYLINDER: -0.75

## 2025-03-17 ENCOUNTER — TELEPHONE (OUTPATIENT)
Dept: HEMATOLOGY/ONCOLOGY | Facility: CLINIC | Age: 50
End: 2025-03-17
Payer: COMMERCIAL

## 2025-03-17 NOTE — TELEPHONE ENCOUNTER
Confirmed appointment for 3/20/25 at [facility] and request patient have labs drawn at [facility] [address] on 3/18 or 3/19 for results review with provider. He states he will try to get labs drawn in advance.

## 2025-03-18 ENCOUNTER — LAB (OUTPATIENT)
Dept: LAB | Facility: CLINIC | Age: 50
End: 2025-03-18
Payer: COMMERCIAL

## 2025-03-18 DIAGNOSIS — D64.9 ANEMIA, UNSPECIFIED TYPE: ICD-10-CM

## 2025-03-18 LAB
ALBUMIN SERPL BCP-MCNC: 4.8 G/DL (ref 3.4–5)
ALP SERPL-CCNC: 36 U/L (ref 33–120)
ALT SERPL W P-5'-P-CCNC: 19 U/L (ref 10–52)
ANION GAP SERPL CALC-SCNC: 12 MMOL/L (ref 10–20)
AST SERPL W P-5'-P-CCNC: 15 U/L (ref 9–39)
BASOPHILS # BLD AUTO: 0.02 X10*3/UL (ref 0–0.1)
BASOPHILS NFR BLD AUTO: 0.3 %
BILIRUB SERPL-MCNC: 0.4 MG/DL (ref 0–1.2)
BUN SERPL-MCNC: 21 MG/DL (ref 6–23)
CALCIUM SERPL-MCNC: 9.8 MG/DL (ref 8.6–10.6)
CHLORIDE SERPL-SCNC: 101 MMOL/L (ref 98–107)
CO2 SERPL-SCNC: 29 MMOL/L (ref 21–32)
CREAT SERPL-MCNC: 1.39 MG/DL (ref 0.5–1.3)
EGFRCR SERPLBLD CKD-EPI 2021: 63 ML/MIN/1.73M*2
EOSINOPHIL # BLD AUTO: 0.08 X10*3/UL (ref 0–0.7)
EOSINOPHIL NFR BLD AUTO: 1.3 %
ERYTHROCYTE [DISTWIDTH] IN BLOOD BY AUTOMATED COUNT: 14.1 % (ref 11.5–14.5)
FERRITIN SERPL-MCNC: 485 NG/ML (ref 20–300)
FOLATE SERPL-MCNC: 14.7 NG/ML
GLUCOSE SERPL-MCNC: 190 MG/DL (ref 74–99)
HCT VFR BLD AUTO: 36.6 % (ref 41–52)
HGB BLD-MCNC: 12 G/DL (ref 13.5–17.5)
IMM GRANULOCYTES # BLD AUTO: 0.01 X10*3/UL (ref 0–0.7)
IMM GRANULOCYTES NFR BLD AUTO: 0.2 % (ref 0–0.9)
IRON SATN MFR SERPL: 15 % (ref 25–45)
IRON SERPL-MCNC: 67 UG/DL (ref 35–150)
LYMPHOCYTES # BLD AUTO: 1.89 X10*3/UL (ref 1.2–4.8)
LYMPHOCYTES NFR BLD AUTO: 31 %
MCH RBC QN AUTO: 27.9 PG (ref 26–34)
MCHC RBC AUTO-ENTMCNC: 32.8 G/DL (ref 32–36)
MCV RBC AUTO: 85 FL (ref 80–100)
MONOCYTES # BLD AUTO: 0.66 X10*3/UL (ref 0.1–1)
MONOCYTES NFR BLD AUTO: 10.8 %
NEUTROPHILS # BLD AUTO: 3.44 X10*3/UL (ref 1.2–7.7)
NEUTROPHILS NFR BLD AUTO: 56.4 %
NRBC BLD-RTO: ABNORMAL /100{WBCS}
PLATELET # BLD AUTO: 376 X10*3/UL (ref 150–450)
POTASSIUM SERPL-SCNC: 3.8 MMOL/L (ref 3.5–5.3)
PROT SERPL-MCNC: 7.5 G/DL (ref 6.4–8.2)
RBC # BLD AUTO: 4.3 X10*6/UL (ref 4.5–5.9)
SODIUM SERPL-SCNC: 138 MMOL/L (ref 136–145)
TIBC SERPL-MCNC: 452 UG/DL (ref 240–445)
UIBC SERPL-MCNC: 385 UG/DL (ref 110–370)
VIT B12 SERPL-MCNC: 620 PG/ML (ref 211–911)
WBC # BLD AUTO: 6.1 X10*3/UL (ref 4.4–11.3)

## 2025-03-18 PROCEDURE — 84075 ASSAY ALKALINE PHOSPHATASE: CPT

## 2025-03-18 PROCEDURE — 82746 ASSAY OF FOLIC ACID SERUM: CPT

## 2025-03-18 PROCEDURE — 85025 COMPLETE CBC W/AUTO DIFF WBC: CPT

## 2025-03-18 PROCEDURE — 82728 ASSAY OF FERRITIN: CPT

## 2025-03-18 PROCEDURE — 82607 VITAMIN B-12: CPT

## 2025-03-18 PROCEDURE — 83540 ASSAY OF IRON: CPT

## 2025-03-18 PROCEDURE — 36415 COLL VENOUS BLD VENIPUNCTURE: CPT

## 2025-03-20 ENCOUNTER — OFFICE VISIT (OUTPATIENT)
Dept: HEMATOLOGY/ONCOLOGY | Facility: CLINIC | Age: 50
End: 2025-03-20
Payer: COMMERCIAL

## 2025-03-20 VITALS
RESPIRATION RATE: 20 BRPM | TEMPERATURE: 98.4 F | SYSTOLIC BLOOD PRESSURE: 121 MMHG | OXYGEN SATURATION: 98 % | DIASTOLIC BLOOD PRESSURE: 81 MMHG | WEIGHT: 259.9 LBS | BODY MASS INDEX: 37.29 KG/M2

## 2025-03-20 DIAGNOSIS — D64.9 ANEMIA, UNSPECIFIED TYPE: ICD-10-CM

## 2025-03-20 PROCEDURE — 99214 OFFICE O/P EST MOD 30 MIN: CPT | Performed by: NURSE PRACTITIONER

## 2025-03-20 PROCEDURE — 3079F DIAST BP 80-89 MM HG: CPT | Performed by: NURSE PRACTITIONER

## 2025-03-20 PROCEDURE — 4010F ACE/ARB THERAPY RXD/TAKEN: CPT | Performed by: NURSE PRACTITIONER

## 2025-03-20 PROCEDURE — 1036F TOBACCO NON-USER: CPT | Performed by: NURSE PRACTITIONER

## 2025-03-20 PROCEDURE — 3074F SYST BP LT 130 MM HG: CPT | Performed by: NURSE PRACTITIONER

## 2025-03-20 ASSESSMENT — PAIN SCALES - GENERAL: PAINLEVEL_OUTOF10: 4

## 2025-03-20 NOTE — PROGRESS NOTES
Patient ID: James Esqueda is a 49 y.o. male.  Referring Physician: Rosanne M Casal, APRN-CNP, DNP  [id number] [place] [address]  Primary Care Provider: Duke Polanco MD  Visit Type: Follow Up       Subjective    Diagnosis/Reason: Multifactorial Anemia - CABRERA (2/2 Malabsortion), Anemia of Chronic Dx    Interval Hx  3/20/2025-Patient presents alone for a follow up. He reports he is well. Has not taken oral iron for over 1 year.     9/19/2024  James Esqueda is a 49 y.o. male following up today for transfer of care for multifactorial anemia d/t CABRERA (2/2 malabsorption) & anemia of chronic disease    Patient denies weight loss, abnormal bruising and bleeding, hematuria, blood in stool, dark/black stools, epistaxis, oral/gingival bleeding, lymphadenopathy, recurrent infections, recurrent fevers, night sweats, early satiety, abdominal pain, bone pain, chest pain, palpitations, SOB, HERNANDEZ, fatigue, dizziness, lightheadedness, PICA.     Relevant Hx  7/5/23 - Last Visit w/ H. MIKO Dominguez-DEMAR  Mr. Esqueda is a 47 yo patient with a PMH of DMII, CKD, HTN, Hyperlipidemia, Bipolar, Schizophrenia, Depression, Myopathy, and GERD. He was referred to benign hematology for consultation of anemia. Referred by Dr. Calhoun.     Today, patient presents for followup to review labs from last week. Reports feeling fell overall. Started Pantoprazole once daily for his epigastric stomach pains and notes it has improved. No abnormal bruising or bleeding. Notes a couple episodes of dark stool.  Has been off of oral iron and vit c since last appt about 3 mo ago.     Denies abnormal weight loss, night sweats, fever. Denies chills, sob, v/d/c. No PICA. Denies any other abnormal bleeding or bruising (hx GI bld over 2.5yrs ago). No recurrent infections. Has had surgery in past w/o issue. Never had blood/blood products.      PMHx:       Active Ambulatory Problems     Diagnosis Date Noted    Controlled type 2 diabetes mellitus without complication,  without long-term current use of insulin (CMS/HCC) 04/18/2023    Bipolar affective disorder (CMS/HCC) 04/18/2023    Hyperlipidemia 04/18/2023    Hypertension 04/18/2023    Myopia of both eyes 04/18/2023    Schizophrenia (CMS/[facility]) 04/18/2023    Abdominal cramping 10/18/2023    Anemia 10/18/2023    Chronic cough 10/18/2023    Chronic low back pain 10/18/2023    Finger tendinitis 10/18/2023    Astigmatism of both eyes 11/21/2023    Presbyopia 11/21/2023           Resolved Ambulatory Problems     Diagnosis Date Noted    Depression 04/18/2023    Erosive gastropathy 04/18/2023    H/O colonoscopy 04/18/2023    History of esophagogastroduodenoscopy (EGD) 04/18/2023    Lower GI bleed 04/18/2023    Colon polyp 04/18/2023           Past Medical History:   Diagnosis Date    Bitten or stung by nonvenomous insect and other nonvenomous arthropods, initial encounter 10/08/2015    Personal history of other infectious and parasitic diseases      Personal history of other infectious and parasitic diseases      Personal history of other infectious and parasitic diseases      Prediabetes 12/08/2016    Prediabetes 12/08/2016    Prediabetes 12/08/2016    Sprain of interphalangeal joint of unspecified finger, initial encounter      Objective   BSA: 2.41 meters squared  /81 (BP Location: Left arm, Patient Position: Sitting, BP Cuff Size: Large adult)   Temp 36.9 °C (98.4 °F) (Core)   Resp 20   Wt 118 kg (259 lb 14.4 oz)   SpO2 98%   BMI 37.29 kg/m²     Family History   Problem Relation Name Age of Onset    No Known Problems Mother      No Known Problems Father         Physical Exam  HENT:      Head: Normocephalic.      Nose: Nose normal.      Mouth/Throat:      Mouth: Mucous membranes are moist.   Eyes:      Pupils: Pupils are equal, round, and reactive to light.   Cardiovascular:      Rate and Rhythm: Normal rate and regular rhythm.      Pulses: Normal pulses.      Heart sounds: Normal heart sounds.   Pulmonary:      Effort:  Pulmonary effort is normal.      Breath sounds: Normal breath sounds.   Abdominal:      General: Bowel sounds are normal.      Palpations: Abdomen is soft.   Musculoskeletal:         General: Normal range of motion.   Skin:     General: Skin is warm and dry.   Neurological:      General: No focal deficit present.      Mental Status: He is alert and oriented to person, place, and time.   Psychiatric:         Mood and Affect: Mood normal.         Behavior: Behavior normal.       WBC   Date/Time Value Ref Range Status   03/18/2025 12:34 PM 6.1 4.4 - 11.3 x10*3/uL Final   09/19/2024 01:26 PM 7.0 4.4 - 11.3 x10*3/uL Final   02/15/2024 03:17 PM 7.0 4.4 - 11.3 x10*3/uL Final     nRBC   Date Value Ref Range Status   03/18/2025   Final     Comment:     Not Measured   09/19/2024   Final     Comment:     Not Measured   02/15/2024 0.0 0.0 - 0.0 /100 WBCs Final     RBC   Date Value Ref Range Status   03/18/2025 4.30 (L) 4.50 - 5.90 x10*6/uL Final   09/19/2024 4.34 (L) 4.50 - 5.90 x10*6/uL Final   02/15/2024 4.50 4.50 - 5.90 x10*6/uL Final     Hemoglobin   Date Value Ref Range Status   03/18/2025 12.0 (L) 13.5 - 17.5 g/dL Final   09/19/2024 11.9 (L) 13.5 - 17.5 g/dL Final   02/15/2024 12.2 (L) 13.5 - 17.5 g/dL Final     Hematocrit   Date Value Ref Range Status   03/18/2025 36.6 (L) 41.0 - 52.0 % Final   09/19/2024 37.3 (L) 41.0 - 52.0 % Final   02/15/2024 37.3 (L) 41.0 - 52.0 % Final     MCV   Date/Time Value Ref Range Status   03/18/2025 12:34 PM 85 80 - 100 fL Final   09/19/2024 01:26 PM 86 80 - 100 fL Final   02/15/2024 03:17 PM 83 80 - 100 fL Final     MCH   Date/Time Value Ref Range Status   03/18/2025 12:34 PM 27.9 26.0 - 34.0 pg Final   09/19/2024 01:26 PM 27.4 26.0 - 34.0 pg Final   02/15/2024 03:17 PM 27.1 26.0 - 34.0 pg Final     MCHC   Date/Time Value Ref Range Status   03/18/2025 12:34 PM 32.8 32.0 - 36.0 g/dL Final   09/19/2024 01:26 PM 31.9 (L) 32.0 - 36.0 g/dL Final   02/15/2024 03:17 PM 32.7 32.0 - 36.0 g/dL Final      RDW   Date/Time Value Ref Range Status   03/18/2025 12:34 PM 14.1 11.5 - 14.5 % Final   09/19/2024 01:26 PM 14.1 11.5 - 14.5 % Final   02/15/2024 03:17 PM 13.9 11.5 - 14.5 % Final     Platelets   Date/Time Value Ref Range Status   03/18/2025 12:34  150 - 450 x10*3/uL Final   09/19/2024 01:26  150 - 450 x10*3/uL Final   02/15/2024 03:17  150 - 450 x10*3/uL Final     MPV   Date/Time Value Ref Range Status   10/04/2023 04:02 PM 9.3 7.5 - 11.5 fL Final     Neutrophils %   Date/Time Value Ref Range Status   03/18/2025 12:34 PM 56.4 40.0 - 80.0 % Final   09/19/2024 01:26 PM 53.0 40.0 - 80.0 % Final   02/15/2024 03:17 PM 61.8 40.0 - 80.0 % Final     Immature Granulocytes %, Automated   Date/Time Value Ref Range Status   03/18/2025 12:34 PM 0.2 0.0 - 0.9 % Final     Comment:     Immature Granulocyte Count (IG) includes promyelocytes, myelocytes and metamyelocytes but does not include bands. Percent differential counts (%) should be interpreted in the context of the absolute cell counts (cells/UL).   09/19/2024 01:26 PM 1.0 (H) 0.0 - 0.9 % Final     Comment:     Immature Granulocyte Count (IG) includes promyelocytes, myelocytes and metamyelocytes but does not include bands. Percent differential counts (%) should be interpreted in the context of the absolute cell counts (cells/UL).   02/15/2024 03:17 PM 0.7 0.0 - 0.9 % Final     Comment:     Immature Granulocyte Count (IG) includes promyelocytes, myelocytes and metamyelocytes but does not include bands. Percent differential counts (%) should be interpreted in the context of the absolute cell counts (cells/UL).     Lymphocytes %   Date/Time Value Ref Range Status   03/18/2025 12:34 PM 31.0 13.0 - 44.0 % Final   09/19/2024 01:26 PM 37.4 13.0 - 44.0 % Final   02/15/2024 03:17 PM 28.9 13.0 - 44.0 % Final     Monocytes %   Date/Time Value Ref Range Status   03/18/2025 12:34 PM 10.8 2.0 - 10.0 % Final   09/19/2024 01:26 PM 7.2 2.0 - 10.0 % Final   02/15/2024  03:17 PM 7.2 2.0 - 10.0 % Final     Eosinophils %   Date/Time Value Ref Range Status   03/18/2025 12:34 PM 1.3 0.0 - 6.0 % Final   09/19/2024 01:26 PM 1.1 0.0 - 6.0 % Final   02/15/2024 03:17 PM 1.0 0.0 - 6.0 % Final     Basophils %   Date/Time Value Ref Range Status   03/18/2025 12:34 PM 0.3 0.0 - 2.0 % Final   09/19/2024 01:26 PM 0.3 0.0 - 2.0 % Final   02/15/2024 03:17 PM 0.4 0.0 - 2.0 % Final     Neutrophils Absolute   Date/Time Value Ref Range Status   03/18/2025 12:34 PM 3.44 1.20 - 7.70 x10*3/uL Final     Comment:     Percent differential counts (%) should be interpreted in the context of the absolute cell counts (cells/uL).   09/19/2024 01:26 PM 3.69 1.20 - 7.70 x10*3/uL Final     Comment:     Percent differential counts (%) should be interpreted in the context of the absolute cell counts (cells/uL).   02/15/2024 03:17 PM 4.30 1.20 - 7.70 x10*3/uL Final     Comment:     Percent differential counts (%) should be interpreted in the context of the absolute cell counts (cells/uL).     Immature Granulocytes Absolute, Automated   Date/Time Value Ref Range Status   03/18/2025 12:34 PM 0.01 0.00 - 0.70 x10*3/uL Final   09/19/2024 01:26 PM 0.07 0.00 - 0.70 x10*3/uL Final   02/15/2024 03:17 PM 0.05 0.00 - 0.70 x10*3/uL Final     Lymphocytes Absolute   Date/Time Value Ref Range Status   03/18/2025 12:34 PM 1.89 1.20 - 4.80 x10*3/uL Final   09/19/2024 01:26 PM 2.61 1.20 - 4.80 x10*3/uL Final   02/15/2024 03:17 PM 2.01 1.20 - 4.80 x10*3/uL Final     Monocytes Absolute   Date/Time Value Ref Range Status   03/18/2025 12:34 PM 0.66 0.10 - 1.00 x10*3/uL Final   09/19/2024 01:26 PM 0.50 0.10 - 1.00 x10*3/uL Final   02/15/2024 03:17 PM 0.50 0.10 - 1.00 x10*3/uL Final     Eosinophils Absolute   Date/Time Value Ref Range Status   03/18/2025 12:34 PM 0.08 0.00 - 0.70 x10*3/uL Final   09/19/2024 01:26 PM 0.08 0.00 - 0.70 x10*3/uL Final   02/15/2024 03:17 PM 0.07 0.00 - 0.70 x10*3/uL Final     Basophils Absolute   Date/Time Value  Ref Range Status   03/18/2025 12:34 PM 0.02 0.00 - 0.10 x10*3/uL Final   09/19/2024 01:26 PM 0.02 0.00 - 0.10 x10*3/uL Final   02/15/2024 03:17 PM 0.03 0.00 - 0.10 x10*3/uL Final      Latest Reference Range & Units 01/03/24 11:56 02/15/24 15:17 09/19/24 13:26 03/18/25 12:34   FERRITIN 20 - 300 ng/mL 370 (H)  384 (H) 485 (H)   FOLATE >5.0 ng/mL >24.0 >24.0 >24.0  >24.0 14.7   IRON 35 - 150 ug/dL 67  57 67   TIBC 240 - 445 ug/dL 437  440 452 (H)   % Saturation 25 - 45 % 15 (L)  13 (L) 15 (L)   UIBC 110 - 370 ug/dL 370  383 (H) 385 (H)   Vitamin B12 211 - 911 pg/mL 503 700 586 620     Assessment/Plan    James Esqueda is a 49 y.o. male following up today for multifactorial anemia d/t CABRERA (2/2 malabsorption) and anemia of chronic disease.   He does have hx of GI bleed. Colonoscopy completed in 2024 showed non precancerous polyps, repeat recommended in 7 year, no evidence of GI bleed. Hx of elevated Ferritin and low % Tsat. Had positive RAMONA in 2020.    Plan:  -Patient has been off oral iron for over 1 year. We not currently treating him   -Patient was instructed that he can follow up with CBC/diff every 6-12 months with PCP and we do not require any follow up with heme at this time. We are happy to see this patient in the future if questions or problems arise.      I had an extensive discussion with the patient regarding the diagnosis and discussed the plan of therapy, including general considerations regarding side effects and outcomes. Pt understood and gave appropriate teach back about the plan of care. All questions were answered to the patient's satisfaction. The patient is instructed to contact us at any time if questions or problems arise. Thank you for the opportunity to participate in the care of this very pleasant patient.       Patient was seen and evaluated under the supervision of Rosanne M Casal, APRN-CNP, DNP--Daria Mcdaniels, APRN-CNP      Rosanne M Casal, APRN-DEMAR, DNP

## 2025-04-02 DIAGNOSIS — I10 ESSENTIAL (PRIMARY) HYPERTENSION: ICD-10-CM

## 2025-04-09 ENCOUNTER — APPOINTMENT (OUTPATIENT)
Dept: PRIMARY CARE | Facility: CLINIC | Age: 50
End: 2025-04-09
Payer: COMMERCIAL

## 2025-04-09 VITALS
WEIGHT: 245 LBS | HEART RATE: 93 BPM | DIASTOLIC BLOOD PRESSURE: 77 MMHG | SYSTOLIC BLOOD PRESSURE: 124 MMHG | TEMPERATURE: 98.3 F | BODY MASS INDEX: 35.15 KG/M2

## 2025-04-09 DIAGNOSIS — E11.9 CONTROLLED TYPE 2 DIABETES MELLITUS WITHOUT COMPLICATION, WITHOUT LONG-TERM CURRENT USE OF INSULIN: Primary | ICD-10-CM

## 2025-04-09 DIAGNOSIS — I10 ESSENTIAL (PRIMARY) HYPERTENSION: ICD-10-CM

## 2025-04-09 DIAGNOSIS — E78.2 MIXED HYPERLIPIDEMIA: ICD-10-CM

## 2025-04-09 PROBLEM — R05.3 CHRONIC COUGH: Status: RESOLVED | Noted: 2023-10-18 | Resolved: 2025-04-09

## 2025-04-09 PROCEDURE — 3074F SYST BP LT 130 MM HG: CPT | Performed by: INTERNAL MEDICINE

## 2025-04-09 PROCEDURE — 1036F TOBACCO NON-USER: CPT | Performed by: INTERNAL MEDICINE

## 2025-04-09 PROCEDURE — G2211 COMPLEX E/M VISIT ADD ON: HCPCS | Performed by: INTERNAL MEDICINE

## 2025-04-09 PROCEDURE — 99213 OFFICE O/P EST LOW 20 MIN: CPT | Performed by: INTERNAL MEDICINE

## 2025-04-09 PROCEDURE — 3078F DIAST BP <80 MM HG: CPT | Performed by: INTERNAL MEDICINE

## 2025-04-09 PROCEDURE — 4010F ACE/ARB THERAPY RXD/TAKEN: CPT | Performed by: INTERNAL MEDICINE

## 2025-04-09 ASSESSMENT — ENCOUNTER SYMPTOMS
COUGH: 0
POLYDIPSIA: 0
PALPITATIONS: 0
FEVER: 0
CHILLS: 0
SHORTNESS OF BREATH: 0

## 2025-04-09 ASSESSMENT — PAIN SCALES - GENERAL: PAINLEVEL_OUTOF10: 0-NO PAIN

## 2025-04-09 NOTE — PROGRESS NOTES
Subjective   Patient ID: James Esqueda is a 49 y.o. male who presents for No chief complaint on file..    Pt presents for routine DM follow up. Clinically stable. DM controlled historically. Did have one low sugar event since last encounter, today in fact during his counseling session. Corrected rapidly on food intake. No concerns since. No real hx of prior events. No polydipsia, mild increased urination without other symptoms. No abdominal pain. No vision issues, last eye exam in Dec 2024.              Review of Systems   Constitutional:  Negative for chills and fever.   Respiratory:  Negative for cough and shortness of breath.    Cardiovascular:  Negative for chest pain and palpitations.   Endocrine: Negative for polydipsia and polyuria.       Objective   /77 (BP Location: Left arm, Patient Position: Sitting, BP Cuff Size: Adult)   Pulse 93   Temp 36.8 °C (98.3 °F) (Temporal)   Wt 111 kg (245 lb)   BMI 35.15 kg/m²     Physical Exam  Constitutional:       Appearance: Normal appearance.   HENT:      Head: Normocephalic and atraumatic.   Eyes:      Extraocular Movements: Extraocular movements intact.      Pupils: Pupils are equal, round, and reactive to light.   Neck:      Thyroid: No thyroid mass or thyromegaly.      Vascular: No carotid bruit.   Cardiovascular:      Rate and Rhythm: Normal rate and regular rhythm.      Heart sounds: No murmur heard.     No friction rub. No gallop.   Pulmonary:      Effort: No respiratory distress.      Breath sounds: No wheezing, rhonchi or rales.   Musculoskeletal:      Cervical back: Neck supple.      Right lower leg: No edema.      Left lower leg: No edema.   Lymphadenopathy:      Cervical: No cervical adenopathy.   Neurological:      Mental Status: He is alert.         Assessment/Plan   Assessment & Plan  Controlled type 2 diabetes mellitus without complication, without long-term current use of insulin    Orders:    Comprehensive Metabolic Panel; Future    Hemoglobin  A1C; Future    Lipid Panel; Future    Albumin-Creatinine Ratio, Urine Random; Future    Essential (primary) hypertension    Orders:    Comprehensive Metabolic Panel; Future    Hemoglobin A1C; Future    Lipid Panel; Future    Mixed hyperlipidemia    Orders:    Comprehensive Metabolic Panel; Future    Hemoglobin A1C; Future    Lipid Panel; Future

## 2025-07-09 ENCOUNTER — APPOINTMENT (OUTPATIENT)
Dept: PRIMARY CARE | Facility: CLINIC | Age: 50
End: 2025-07-09
Payer: COMMERCIAL

## 2025-07-09 VITALS — DIASTOLIC BLOOD PRESSURE: 63 MMHG | SYSTOLIC BLOOD PRESSURE: 108 MMHG | HEART RATE: 56 BPM | TEMPERATURE: 97.5 F

## 2025-07-09 DIAGNOSIS — E11.65 TYPE 2 DIABETES MELLITUS WITH HYPERGLYCEMIA (MULTI): ICD-10-CM

## 2025-07-09 DIAGNOSIS — I10 ESSENTIAL (PRIMARY) HYPERTENSION: ICD-10-CM

## 2025-07-09 DIAGNOSIS — E11.9 DIABETES MELLITUS TYPE 2 WITHOUT RETINOPATHY (MULTI): ICD-10-CM

## 2025-07-09 DIAGNOSIS — E78.2 MIXED HYPERLIPIDEMIA: ICD-10-CM

## 2025-07-09 DIAGNOSIS — E11.9 CONTROLLED TYPE 2 DIABETES MELLITUS WITHOUT COMPLICATION, WITHOUT LONG-TERM CURRENT USE OF INSULIN: ICD-10-CM

## 2025-07-09 DIAGNOSIS — E11.9 CONTROLLED TYPE 2 DIABETES MELLITUS WITHOUT COMPLICATION, WITHOUT LONG-TERM CURRENT USE OF INSULIN: Primary | ICD-10-CM

## 2025-07-09 DIAGNOSIS — I10 PRIMARY HYPERTENSION: ICD-10-CM

## 2025-07-09 DIAGNOSIS — K31.89 EROSIVE GASTROPATHY: ICD-10-CM

## 2025-07-09 LAB
ALBUMIN SERPL-MCNC: 4.8 G/DL (ref 3.6–5.1)
ALBUMIN/CREAT UR: NORMAL MG/G CREAT
ALP SERPL-CCNC: 31 U/L (ref 36–130)
ALT SERPL-CCNC: 13 U/L (ref 9–46)
ANION GAP SERPL CALCULATED.4IONS-SCNC: 9 MMOL/L (CALC) (ref 7–17)
AST SERPL-CCNC: 13 U/L (ref 10–40)
BILIRUB SERPL-MCNC: 0.4 MG/DL (ref 0.2–1.2)
BUN SERPL-MCNC: 27 MG/DL (ref 7–25)
CALCIUM SERPL-MCNC: 9.8 MG/DL (ref 8.6–10.3)
CHLORIDE SERPL-SCNC: 102 MMOL/L (ref 98–110)
CHOLEST SERPL-MCNC: 118 MG/DL
CHOLEST/HDLC SERPL: 2.5 (CALC)
CO2 SERPL-SCNC: 29 MMOL/L (ref 20–32)
CREAT SERPL-MCNC: 1.32 MG/DL (ref 0.6–1.29)
CREAT UR-MCNC: 182 MG/DL (ref 20–320)
EGFRCR SERPLBLD CKD-EPI 2021: 67 ML/MIN/1.73M2
EST. AVERAGE GLUCOSE BLD GHB EST-MCNC: 126 MG/DL
EST. AVERAGE GLUCOSE BLD GHB EST-SCNC: 7 MMOL/L
GLUCOSE SERPL-MCNC: 121 MG/DL (ref 65–99)
HBA1C MFR BLD: 6 %
HDLC SERPL-MCNC: 48 MG/DL
LDLC SERPL CALC-MCNC: 56 MG/DL (CALC)
MICROALBUMIN UR-MCNC: <0.2 MG/DL
NONHDLC SERPL-MCNC: 70 MG/DL (CALC)
POTASSIUM SERPL-SCNC: 4.1 MMOL/L (ref 3.5–5.3)
PROT SERPL-MCNC: 7.5 G/DL (ref 6.1–8.1)
SODIUM SERPL-SCNC: 140 MMOL/L (ref 135–146)
TRIGL SERPL-MCNC: 48 MG/DL

## 2025-07-09 PROCEDURE — 99213 OFFICE O/P EST LOW 20 MIN: CPT | Performed by: INTERNAL MEDICINE

## 2025-07-09 PROCEDURE — 3078F DIAST BP <80 MM HG: CPT | Performed by: INTERNAL MEDICINE

## 2025-07-09 PROCEDURE — G2211 COMPLEX E/M VISIT ADD ON: HCPCS | Performed by: INTERNAL MEDICINE

## 2025-07-09 PROCEDURE — 1036F TOBACCO NON-USER: CPT | Performed by: INTERNAL MEDICINE

## 2025-07-09 PROCEDURE — 4010F ACE/ARB THERAPY RXD/TAKEN: CPT | Performed by: INTERNAL MEDICINE

## 2025-07-09 PROCEDURE — 3074F SYST BP LT 130 MM HG: CPT | Performed by: INTERNAL MEDICINE

## 2025-07-09 RX ORDER — TRIAMTERENE AND HYDROCHLOROTHIAZIDE 37.5; 25 MG/1; MG/1
1 TABLET ORAL DAILY
Qty: 90 TABLET | Refills: 3 | Status: SHIPPED | OUTPATIENT
Start: 2025-07-09 | End: 2026-07-09

## 2025-07-09 RX ORDER — ATORVASTATIN CALCIUM 20 MG/1
20 TABLET, FILM COATED ORAL NIGHTLY
Qty: 90 TABLET | Refills: 3 | Status: SHIPPED | OUTPATIENT
Start: 2025-07-09 | End: 2026-07-09

## 2025-07-09 RX ORDER — PANTOPRAZOLE SODIUM 40 MG/1
40 TABLET, DELAYED RELEASE ORAL DAILY
Qty: 90 TABLET | Refills: 3 | Status: SHIPPED | OUTPATIENT
Start: 2025-07-09

## 2025-07-09 RX ORDER — LOSARTAN POTASSIUM 100 MG/1
100 TABLET ORAL DAILY
Qty: 90 TABLET | Refills: 3 | Status: SHIPPED | OUTPATIENT
Start: 2025-07-09

## 2025-07-09 RX ORDER — AMLODIPINE BESYLATE 2.5 MG/1
2.5 TABLET ORAL DAILY
Qty: 90 TABLET | Refills: 3 | Status: SHIPPED | OUTPATIENT
Start: 2025-07-09 | End: 2026-07-09

## 2025-07-09 RX ORDER — GLIMEPIRIDE 2 MG/1
6 TABLET ORAL DAILY
Qty: 270 TABLET | Refills: 3 | Status: SHIPPED | OUTPATIENT
Start: 2025-07-09 | End: 2026-07-09

## 2025-07-09 RX ORDER — METFORMIN HYDROCHLORIDE 500 MG/1
1000 TABLET, EXTENDED RELEASE ORAL DAILY
Qty: 180 TABLET | Refills: 3 | Status: SHIPPED | OUTPATIENT
Start: 2025-07-09

## 2025-07-09 ASSESSMENT — ENCOUNTER SYMPTOMS
FEVER: 0
PALPITATIONS: 0
CHILLS: 0
COUGH: 0
SHORTNESS OF BREATH: 0
POLYDIPSIA: 0

## 2025-07-09 NOTE — PROGRESS NOTES
Subjective   Patient ID: James Esqueda is a 50 y.o. male who presents for Follow-up.    Last eye exam Dec 2024- does every December.     50-year-old male presents today for routine follow-up on type 2 diabetes hypertension.  Since her last encounter he has had wonderful weight loss of approximately 12 pounds, his A1c continues to be under excellent control at 6.0 this was obtained just prior to the appointment from our lab system.  He has stable mild CKD with a creatinine baseline of 1.3, and a well-controlled cholesterol profile.  His blood pressure is under excellent control with his current regimen of medications.  There has been no significant changes to his long-term maintenance on my and or from psychiatry since her last encounter.  He is tolerating his medications well and has no active or acute issues at this time that need to be addressed.  He is up-to-date with his other preventative health measures, he has had a prolonged level of good control over his blood sugar greater than 1 year of an A1c under 6.5.  He has no hypoglycemia events or symptoms of note.  He requires no significant interventions on acute issues at the time of today's visit.         Review of Systems   Constitutional:  Negative for chills and fever.   Respiratory:  Negative for cough and shortness of breath.    Cardiovascular:  Negative for chest pain and palpitations.   Endocrine: Negative for polydipsia and polyuria.       Objective   /63   Pulse 56   Temp 36.4 °C (97.5 °F) (Temporal)     Physical Exam  Constitutional:       Appearance: Normal appearance.   HENT:      Head: Normocephalic and atraumatic.   Eyes:      Extraocular Movements: Extraocular movements intact.      Pupils: Pupils are equal, round, and reactive to light.   Neck:      Thyroid: No thyroid mass or thyromegaly.   Cardiovascular:      Rate and Rhythm: Normal rate and regular rhythm.      Heart sounds: No murmur heard.     No friction rub. No gallop.    Pulmonary:      Effort: No respiratory distress.      Breath sounds: No wheezing, rhonchi or rales.   Musculoskeletal:      Cervical back: Neck supple.      Right lower leg: No edema.      Left lower leg: No edema.   Neurological:      Mental Status: He is alert.         Assessment/Plan   Assessment & Plan  Controlled type 2 diabetes mellitus without complication, without long-term current use of insulin         Primary hypertension    Orders:    amLODIPine (Norvasc) 2.5 mg tablet; Take 1 tablet (2.5 mg) by mouth once daily. as directed    triamterene-hydrochlorothiazid (Maxzide-25) 37.5-25 mg tablet; Take 1 tablet by mouth once daily.    Diabetes mellitus type 2 without retinopathy (Multi)    Orders:    atorvastatin (Lipitor) 20 mg tablet; Take 1 tablet (20 mg) by mouth once daily at bedtime.    Type 2 diabetes mellitus with hyperglycemia (Multi)    Orders:    glimepiride (Amaryl) 2 mg tablet; Take 3 tablets (6 mg) by mouth once daily.    metFORMIN  mg 24 hr tablet; Take 2 tablets (1,000 mg) by mouth once daily. Do not crush, chew, or split.    Essential (primary) hypertension    Orders:    losartan (Cozaar) 100 mg tablet; Take 1 tablet (100 mg) by mouth once daily.    SITagliptin phosphate (Januvia) 100 mg tablet; Take 1 tablet (100 mg) by mouth once daily.    Erosive gastropathy    Orders:    pantoprazole (ProtoNix) 40 mg EC tablet; Take 1 tablet (40 mg) by mouth once daily. Do not crush, chew, or split.

## 2025-12-30 ENCOUNTER — APPOINTMENT (OUTPATIENT)
Dept: OPHTHALMOLOGY | Facility: CLINIC | Age: 50
End: 2025-12-30
Payer: COMMERCIAL